# Patient Record
Sex: FEMALE | Race: WHITE | NOT HISPANIC OR LATINO | Employment: FULL TIME | URBAN - METROPOLITAN AREA
[De-identification: names, ages, dates, MRNs, and addresses within clinical notes are randomized per-mention and may not be internally consistent; named-entity substitution may affect disease eponyms.]

---

## 2024-11-18 ENCOUNTER — HOSPITAL ENCOUNTER (INPATIENT)
Facility: HOSPITAL | Age: 54
LOS: 3 days | Discharge: HOME | DRG: 871 | End: 2024-11-21
Attending: STUDENT IN AN ORGANIZED HEALTH CARE EDUCATION/TRAINING PROGRAM | Admitting: STUDENT IN AN ORGANIZED HEALTH CARE EDUCATION/TRAINING PROGRAM
Payer: COMMERCIAL

## 2024-11-18 DIAGNOSIS — R94.31 PROLONGED Q-T INTERVAL ON ECG: ICD-10-CM

## 2024-11-18 DIAGNOSIS — N12 PYELONEPHRITIS: ICD-10-CM

## 2024-11-18 DIAGNOSIS — R65.21 SEPTIC SHOCK: ICD-10-CM

## 2024-11-18 DIAGNOSIS — A41.9 SEPTIC SHOCK: ICD-10-CM

## 2024-11-18 DIAGNOSIS — R55 SYNCOPE, UNSPECIFIED SYNCOPE TYPE: ICD-10-CM

## 2024-11-18 DIAGNOSIS — R07.9 CHEST PAIN: ICD-10-CM

## 2024-11-18 DIAGNOSIS — R78.81 GRAM-NEGATIVE BACTEREMIA: ICD-10-CM

## 2024-11-18 DIAGNOSIS — E03.8 SUBCLINICAL HYPOTHYROIDISM: ICD-10-CM

## 2024-11-18 DIAGNOSIS — E11.10 DIABETIC KETOACIDOSIS WITHOUT COMA ASSOCIATED WITH TYPE 2 DIABETES MELLITUS: Primary | ICD-10-CM

## 2024-11-18 DIAGNOSIS — R73.9 HYPERGLYCEMIA: ICD-10-CM

## 2024-11-18 PROBLEM — N87.0 MILD CERVICAL DYSPLASIA: Status: ACTIVE | Noted: 2018-06-15

## 2024-11-18 PROBLEM — F11.20 OPIOID TYPE DEPENDENCE, CONTINUOUS: Status: ACTIVE | Noted: 2021-06-09

## 2024-11-18 PROBLEM — I10 HYPERTENSION: Status: ACTIVE | Noted: 2018-12-11

## 2024-11-18 LAB
ALBUMIN SERPL BCP-MCNC: 3.2 G/DL (ref 3.5–5.2)
ALLENS TEST: ABNORMAL
ALP SERPL-CCNC: 74 U/L (ref 40–150)
ALT SERPL W/O P-5'-P-CCNC: 15 U/L (ref 10–44)
ANION GAP SERPL CALC-SCNC: 14 MMOL/L (ref 8–16)
ANION GAP SERPL CALC-SCNC: 17 MMOL/L (ref 8–16)
ANION GAP SERPL CALC-SCNC: 21 MMOL/L (ref 8–16)
AST SERPL-CCNC: 10 U/L (ref 10–40)
B-OH-BUTYR BLD STRIP-SCNC: 4.5 MMOL/L (ref 0–0.5)
BACTERIA #/AREA URNS HPF: ABNORMAL /HPF
BASOPHILS # BLD AUTO: 0.02 K/UL (ref 0–0.2)
BASOPHILS NFR BLD: 0.2 % (ref 0–1.9)
BILIRUB SERPL-MCNC: 0.8 MG/DL (ref 0.1–1)
BILIRUB UR QL STRIP: NEGATIVE
BNP SERPL-MCNC: 224 PG/ML (ref 0–99)
BUN SERPL-MCNC: 13 MG/DL (ref 6–20)
BUN SERPL-MCNC: 16 MG/DL (ref 6–20)
BUN SERPL-MCNC: 9 MG/DL (ref 6–20)
CALCIUM SERPL-MCNC: 8.9 MG/DL (ref 8.7–10.5)
CALCIUM SERPL-MCNC: 9.1 MG/DL (ref 8.7–10.5)
CALCIUM SERPL-MCNC: 9.4 MG/DL (ref 8.7–10.5)
CHLORIDE SERPL-SCNC: 90 MMOL/L (ref 95–110)
CHLORIDE SERPL-SCNC: 97 MMOL/L (ref 95–110)
CHLORIDE SERPL-SCNC: 98 MMOL/L (ref 95–110)
CHOLEST SERPL-MCNC: 142 MG/DL (ref 120–199)
CHOLEST/HDLC SERPL: 4.3 {RATIO} (ref 2–5)
CLARITY UR: ABNORMAL
CO2 SERPL-SCNC: 16 MMOL/L (ref 23–29)
CO2 SERPL-SCNC: 16 MMOL/L (ref 23–29)
CO2 SERPL-SCNC: 23 MMOL/L (ref 23–29)
COLOR UR: YELLOW
CREAT SERPL-MCNC: 0.8 MG/DL (ref 0.5–1.4)
CREAT SERPL-MCNC: 0.8 MG/DL (ref 0.5–1.4)
CREAT SERPL-MCNC: 1.3 MG/DL (ref 0.5–1.4)
CTP QC/QA: YES
CTP QC/QA: YES
DIFFERENTIAL METHOD BLD: ABNORMAL
EOSINOPHIL # BLD AUTO: 0 K/UL (ref 0–0.5)
EOSINOPHIL NFR BLD: 0 % (ref 0–8)
ERYTHROCYTE [DISTWIDTH] IN BLOOD BY AUTOMATED COUNT: 11.7 % (ref 11.5–14.5)
EST. GFR  (NO RACE VARIABLE): 49 ML/MIN/1.73 M^2
EST. GFR  (NO RACE VARIABLE): >60 ML/MIN/1.73 M^2
EST. GFR  (NO RACE VARIABLE): >60 ML/MIN/1.73 M^2
ESTIMATED AVG GLUCOSE: 235 MG/DL (ref 68–131)
GLUCOSE SERPL-MCNC: 111 MG/DL (ref 70–110)
GLUCOSE SERPL-MCNC: 217 MG/DL (ref 70–110)
GLUCOSE SERPL-MCNC: 453 MG/DL (ref 70–110)
GLUCOSE UR QL STRIP: ABNORMAL
HBA1C MFR BLD: 9.8 % (ref 4–5.6)
HCG INTACT+B SERPL-ACNC: 2.1 MIU/ML
HCO3 UR-SCNC: 20.9 MMOL/L (ref 24–28)
HCT VFR BLD AUTO: 41.9 % (ref 37–48.5)
HDLC SERPL-MCNC: 33 MG/DL (ref 40–75)
HDLC SERPL: 23.2 % (ref 20–50)
HGB BLD-MCNC: 14.7 G/DL (ref 12–16)
HGB UR QL STRIP: ABNORMAL
HYALINE CASTS #/AREA URNS LPF: 0 /LPF
IMM GRANULOCYTES # BLD AUTO: 0.06 K/UL (ref 0–0.04)
IMM GRANULOCYTES NFR BLD AUTO: 0.5 % (ref 0–0.5)
KETONES UR QL STRIP: ABNORMAL
LACTATE SERPL-SCNC: 0.9 MMOL/L (ref 0.5–2.2)
LDLC SERPL CALC-MCNC: 84 MG/DL (ref 63–159)
LEUKOCYTE ESTERASE UR QL STRIP: ABNORMAL
LYMPHOCYTES # BLD AUTO: 1.1 K/UL (ref 1–4.8)
LYMPHOCYTES NFR BLD: 9.8 % (ref 18–48)
MAGNESIUM SERPL-MCNC: 1.3 MG/DL (ref 1.6–2.6)
MAGNESIUM SERPL-MCNC: 1.4 MG/DL (ref 1.6–2.6)
MCH RBC QN AUTO: 29.5 PG (ref 27–31)
MCHC RBC AUTO-ENTMCNC: 35.1 G/DL (ref 32–36)
MCV RBC AUTO: 84 FL (ref 82–98)
MICROSCOPIC COMMENT: ABNORMAL
MONOCYTES # BLD AUTO: 0.8 K/UL (ref 0.3–1)
MONOCYTES NFR BLD: 7.1 % (ref 4–15)
NEUTROPHILS # BLD AUTO: 9.6 K/UL (ref 1.8–7.7)
NEUTROPHILS NFR BLD: 82.4 % (ref 38–73)
NITRITE UR QL STRIP: NEGATIVE
NONHDLC SERPL-MCNC: 109 MG/DL
NRBC BLD-RTO: 0 /100 WBC
OHS QRS DURATION: 88 MS
OHS QTC CALCULATION: 534 MS
PCO2 BLDA: 43.5 MMHG (ref 35–45)
PH SMN: 7.29 [PH] (ref 7.35–7.45)
PH UR STRIP: 6 [PH] (ref 5–8)
PHOSPHATE SERPL-MCNC: 3.7 MG/DL (ref 2.7–4.5)
PLATELET # BLD AUTO: 337 K/UL (ref 150–450)
PMV BLD AUTO: 10.3 FL (ref 9.2–12.9)
PO2 BLDA: 32 MMHG (ref 40–60)
POC BE: -6 MMOL/L
POC MOLECULAR INFLUENZA A AGN: NEGATIVE
POC MOLECULAR INFLUENZA B AGN: NEGATIVE
POC SATURATED O2: 55 % (ref 95–100)
POC TCO2: 22 MMOL/L (ref 24–29)
POCT GLUCOSE: 109 MG/DL (ref 70–110)
POCT GLUCOSE: 125 MG/DL (ref 70–110)
POCT GLUCOSE: 152 MG/DL (ref 70–110)
POCT GLUCOSE: 154 MG/DL (ref 70–110)
POCT GLUCOSE: 170 MG/DL (ref 70–110)
POCT GLUCOSE: 210 MG/DL (ref 70–110)
POCT GLUCOSE: 211 MG/DL (ref 70–110)
POCT GLUCOSE: 238 MG/DL (ref 70–110)
POCT GLUCOSE: 256 MG/DL (ref 70–110)
POCT GLUCOSE: 374 MG/DL (ref 70–110)
POCT GLUCOSE: 422 MG/DL (ref 70–110)
POCT GLUCOSE: 472 MG/DL (ref 70–110)
POCT GLUCOSE: >500 MG/DL (ref 70–110)
POTASSIUM SERPL-SCNC: 3.9 MMOL/L (ref 3.5–5.1)
PROT SERPL-MCNC: 7.3 G/DL (ref 6–8.4)
PROT UR QL STRIP: ABNORMAL
RBC # BLD AUTO: 4.98 M/UL (ref 4–5.4)
RBC #/AREA URNS HPF: 23 /HPF (ref 0–4)
SAMPLE: ABNORMAL
SARS-COV-2 RDRP RESP QL NAA+PROBE: NEGATIVE
SITE: ABNORMAL
SODIUM SERPL-SCNC: 127 MMOL/L (ref 136–145)
SODIUM SERPL-SCNC: 130 MMOL/L (ref 136–145)
SODIUM SERPL-SCNC: 135 MMOL/L (ref 136–145)
SP GR UR STRIP: 1.02 (ref 1–1.03)
SQUAMOUS #/AREA URNS HPF: 4 /HPF
T4 FREE SERPL-MCNC: 1.2 NG/DL (ref 0.71–1.51)
TRIGL SERPL-MCNC: 125 MG/DL (ref 30–150)
TROPONIN I SERPL DL<=0.01 NG/ML-MCNC: 0.01 NG/ML (ref 0–0.03)
TSH SERPL DL<=0.005 MIU/L-ACNC: 6.65 UIU/ML (ref 0.4–4)
URN SPEC COLLECT METH UR: ABNORMAL
UROBILINOGEN UR STRIP-ACNC: NEGATIVE EU/DL
WBC # BLD AUTO: 11.62 K/UL (ref 3.9–12.7)
WBC #/AREA URNS HPF: >100 /HPF (ref 0–5)
WBC CLUMPS URNS QL MICRO: ABNORMAL
YEAST URNS QL MICRO: ABNORMAL

## 2024-11-18 PROCEDURE — 82803 BLOOD GASES ANY COMBINATION: CPT

## 2024-11-18 PROCEDURE — 83880 ASSAY OF NATRIURETIC PEPTIDE: CPT | Performed by: PHYSICIAN ASSISTANT

## 2024-11-18 PROCEDURE — 63600175 PHARM REV CODE 636 W HCPCS: Performed by: HOSPITALIST

## 2024-11-18 PROCEDURE — 85025 COMPLETE CBC W/AUTO DIFF WBC: CPT | Performed by: PHYSICIAN ASSISTANT

## 2024-11-18 PROCEDURE — 21400001 HC TELEMETRY ROOM

## 2024-11-18 PROCEDURE — 84484 ASSAY OF TROPONIN QUANT: CPT | Performed by: PHYSICIAN ASSISTANT

## 2024-11-18 PROCEDURE — 84702 CHORIONIC GONADOTROPIN TEST: CPT | Performed by: HOSPITALIST

## 2024-11-18 PROCEDURE — 80048 BASIC METABOLIC PNL TOTAL CA: CPT | Mod: 91,XB | Performed by: STUDENT IN AN ORGANIZED HEALTH CARE EDUCATION/TRAINING PROGRAM

## 2024-11-18 PROCEDURE — 80061 LIPID PANEL: CPT | Performed by: STUDENT IN AN ORGANIZED HEALTH CARE EDUCATION/TRAINING PROGRAM

## 2024-11-18 PROCEDURE — 93005 ELECTROCARDIOGRAM TRACING: CPT

## 2024-11-18 PROCEDURE — 87088 URINE BACTERIA CULTURE: CPT | Performed by: PHYSICIAN ASSISTANT

## 2024-11-18 PROCEDURE — 96365 THER/PROPH/DIAG IV INF INIT: CPT

## 2024-11-18 PROCEDURE — 84439 ASSAY OF FREE THYROXINE: CPT | Performed by: PHYSICIAN ASSISTANT

## 2024-11-18 PROCEDURE — 83605 ASSAY OF LACTIC ACID: CPT | Performed by: HOSPITALIST

## 2024-11-18 PROCEDURE — 81000 URINALYSIS NONAUTO W/SCOPE: CPT | Performed by: PHYSICIAN ASSISTANT

## 2024-11-18 PROCEDURE — 83036 HEMOGLOBIN GLYCOSYLATED A1C: CPT | Performed by: STUDENT IN AN ORGANIZED HEALTH CARE EDUCATION/TRAINING PROGRAM

## 2024-11-18 PROCEDURE — 63600175 PHARM REV CODE 636 W HCPCS: Performed by: STUDENT IN AN ORGANIZED HEALTH CARE EDUCATION/TRAINING PROGRAM

## 2024-11-18 PROCEDURE — 87040 BLOOD CULTURE FOR BACTERIA: CPT | Performed by: PHYSICIAN ASSISTANT

## 2024-11-18 PROCEDURE — 99900035 HC TECH TIME PER 15 MIN (STAT)

## 2024-11-18 PROCEDURE — 36415 COLL VENOUS BLD VENIPUNCTURE: CPT | Performed by: HOSPITALIST

## 2024-11-18 PROCEDURE — 87186 SC STD MICRODIL/AGAR DIL: CPT | Performed by: PHYSICIAN ASSISTANT

## 2024-11-18 PROCEDURE — 84443 ASSAY THYROID STIM HORMONE: CPT | Performed by: PHYSICIAN ASSISTANT

## 2024-11-18 PROCEDURE — 84100 ASSAY OF PHOSPHORUS: CPT | Performed by: STUDENT IN AN ORGANIZED HEALTH CARE EDUCATION/TRAINING PROGRAM

## 2024-11-18 PROCEDURE — 25000003 PHARM REV CODE 250: Performed by: STUDENT IN AN ORGANIZED HEALTH CARE EDUCATION/TRAINING PROGRAM

## 2024-11-18 PROCEDURE — 87635 SARS-COV-2 COVID-19 AMP PRB: CPT | Performed by: PHYSICIAN ASSISTANT

## 2024-11-18 PROCEDURE — 82962 GLUCOSE BLOOD TEST: CPT

## 2024-11-18 PROCEDURE — 87502 INFLUENZA DNA AMP PROBE: CPT

## 2024-11-18 PROCEDURE — 83735 ASSAY OF MAGNESIUM: CPT | Mod: 91 | Performed by: STUDENT IN AN ORGANIZED HEALTH CARE EDUCATION/TRAINING PROGRAM

## 2024-11-18 PROCEDURE — 80048 BASIC METABOLIC PNL TOTAL CA: CPT | Mod: XB | Performed by: HOSPITALIST

## 2024-11-18 PROCEDURE — 63600175 PHARM REV CODE 636 W HCPCS: Performed by: PHYSICIAN ASSISTANT

## 2024-11-18 PROCEDURE — 96361 HYDRATE IV INFUSION ADD-ON: CPT

## 2024-11-18 PROCEDURE — 83735 ASSAY OF MAGNESIUM: CPT | Performed by: PHYSICIAN ASSISTANT

## 2024-11-18 PROCEDURE — 25000003 PHARM REV CODE 250: Performed by: PHYSICIAN ASSISTANT

## 2024-11-18 PROCEDURE — 82010 KETONE BODYS QUAN: CPT | Performed by: PHYSICIAN ASSISTANT

## 2024-11-18 PROCEDURE — 80053 COMPREHEN METABOLIC PANEL: CPT | Performed by: PHYSICIAN ASSISTANT

## 2024-11-18 PROCEDURE — 25000003 PHARM REV CODE 250: Performed by: HOSPITALIST

## 2024-11-18 PROCEDURE — 96360 HYDRATION IV INFUSION INIT: CPT | Mod: 59

## 2024-11-18 PROCEDURE — 87086 URINE CULTURE/COLONY COUNT: CPT | Performed by: PHYSICIAN ASSISTANT

## 2024-11-18 PROCEDURE — 99285 EMERGENCY DEPT VISIT HI MDM: CPT | Mod: 25

## 2024-11-18 PROCEDURE — 93010 ELECTROCARDIOGRAM REPORT: CPT | Mod: ,,, | Performed by: INTERNAL MEDICINE

## 2024-11-18 RX ORDER — TIZANIDINE 2 MG/1
2 TABLET ORAL DAILY PRN
Status: DISCONTINUED | OUTPATIENT
Start: 2024-11-18 | End: 2024-11-21 | Stop reason: HOSPADM

## 2024-11-18 RX ORDER — SODIUM CHLORIDE 0.9 % (FLUSH) 0.9 %
10 SYRINGE (ML) INJECTION
Status: DISCONTINUED | OUTPATIENT
Start: 2024-11-18 | End: 2024-11-21 | Stop reason: HOSPADM

## 2024-11-18 RX ORDER — LISINOPRIL 20 MG/1
20 TABLET ORAL
COMMUNITY

## 2024-11-18 RX ORDER — HYDROCODONE BITARTRATE AND ACETAMINOPHEN 10; 325 MG/1; MG/1
1 TABLET ORAL 3 TIMES DAILY PRN
COMMUNITY
Start: 2024-11-06 | End: 2025-02-03

## 2024-11-18 RX ORDER — NOREPINEPHRINE BITARTRATE/D5W 4MG/250ML
0-3 PLASTIC BAG, INJECTION (ML) INTRAVENOUS CONTINUOUS
Status: DISCONTINUED | OUTPATIENT
Start: 2024-11-18 | End: 2024-11-18

## 2024-11-18 RX ORDER — ERGOCALCIFEROL 1.25 MG/1
50000 CAPSULE ORAL
COMMUNITY
Start: 2024-04-08

## 2024-11-18 RX ORDER — SODIUM CHLORIDE 0.9 % (FLUSH) 0.9 %
10 SYRINGE (ML) INJECTION
Status: DISCONTINUED | OUTPATIENT
Start: 2024-11-18 | End: 2024-11-18

## 2024-11-18 RX ORDER — PREGABALIN 100 MG/1
1 CAPSULE ORAL 3 TIMES DAILY
COMMUNITY
Start: 2024-10-24 | End: 2025-10-24

## 2024-11-18 RX ORDER — INSULIN GLARGINE 100 [IU]/ML
10 INJECTION, SOLUTION SUBCUTANEOUS NIGHTLY
Status: DISCONTINUED | OUTPATIENT
Start: 2024-11-18 | End: 2024-11-21 | Stop reason: HOSPADM

## 2024-11-18 RX ORDER — SIMETHICONE 80 MG
1 TABLET,CHEWABLE ORAL 4 TIMES DAILY PRN
Status: DISCONTINUED | OUTPATIENT
Start: 2024-11-18 | End: 2024-11-21 | Stop reason: HOSPADM

## 2024-11-18 RX ORDER — SODIUM CHLORIDE 9 MG/ML
1000 INJECTION, SOLUTION INTRAVENOUS CONTINUOUS
Status: ACTIVE | OUTPATIENT
Start: 2024-11-18 | End: 2024-11-18

## 2024-11-18 RX ORDER — MAGNESIUM SULFATE HEPTAHYDRATE 40 MG/ML
2 INJECTION, SOLUTION INTRAVENOUS
Status: DISCONTINUED | OUTPATIENT
Start: 2024-11-18 | End: 2024-11-21 | Stop reason: HOSPADM

## 2024-11-18 RX ORDER — TALC
6 POWDER (GRAM) TOPICAL NIGHTLY PRN
Status: DISCONTINUED | OUTPATIENT
Start: 2024-11-18 | End: 2024-11-19

## 2024-11-18 RX ORDER — CALCIUM GLUCONATE 20 MG/ML
2 INJECTION, SOLUTION INTRAVENOUS
Status: DISCONTINUED | OUTPATIENT
Start: 2024-11-18 | End: 2024-11-21 | Stop reason: HOSPADM

## 2024-11-18 RX ORDER — ACETAMINOPHEN 325 MG/1
650 TABLET ORAL EVERY 4 HOURS PRN
Status: DISCONTINUED | OUTPATIENT
Start: 2024-11-18 | End: 2024-11-21 | Stop reason: HOSPADM

## 2024-11-18 RX ORDER — ESCITALOPRAM OXALATE 10 MG/1
10 TABLET ORAL DAILY
Status: DISCONTINUED | OUTPATIENT
Start: 2024-11-18 | End: 2024-11-21 | Stop reason: HOSPADM

## 2024-11-18 RX ORDER — FOLIC ACID 1 MG/1
1 TABLET ORAL DAILY
COMMUNITY
Start: 2024-10-01 | End: 2025-10-01

## 2024-11-18 RX ORDER — MUPIROCIN 20 MG/G
OINTMENT TOPICAL 2 TIMES DAILY
Status: DISCONTINUED | OUTPATIENT
Start: 2024-11-18 | End: 2024-11-21 | Stop reason: HOSPADM

## 2024-11-18 RX ORDER — IBUPROFEN 200 MG
24 TABLET ORAL
Status: DISCONTINUED | OUTPATIENT
Start: 2024-11-18 | End: 2024-11-18

## 2024-11-18 RX ORDER — MAGNESIUM SULFATE HEPTAHYDRATE 40 MG/ML
4 INJECTION, SOLUTION INTRAVENOUS
Status: DISCONTINUED | OUTPATIENT
Start: 2024-11-18 | End: 2024-11-21 | Stop reason: HOSPADM

## 2024-11-18 RX ORDER — HYDROCODONE BITARTRATE AND ACETAMINOPHEN 10; 325 MG/1; MG/1
1 TABLET ORAL EVERY 4 HOURS PRN
Status: CANCELLED | OUTPATIENT
Start: 2024-11-18

## 2024-11-18 RX ORDER — SODIUM CHLORIDE 9 MG/ML
1000 INJECTION, SOLUTION INTRAVENOUS CONTINUOUS
Status: DISCONTINUED | OUTPATIENT
Start: 2024-11-18 | End: 2024-11-18

## 2024-11-18 RX ORDER — LANOLIN ALCOHOL/MO/W.PET/CERES
800 CREAM (GRAM) TOPICAL
Status: DISCONTINUED | OUTPATIENT
Start: 2024-11-18 | End: 2024-11-21 | Stop reason: HOSPADM

## 2024-11-18 RX ORDER — METFORMIN HYDROCHLORIDE 500 MG/1
500 TABLET ORAL 2 TIMES DAILY WITH MEALS
COMMUNITY

## 2024-11-18 RX ORDER — DEXTROSE MONOHYDRATE AND SODIUM CHLORIDE 5; .45 G/100ML; G/100ML
INJECTION, SOLUTION INTRAVENOUS CONTINUOUS PRN
Status: ACTIVE | OUTPATIENT
Start: 2024-11-18 | End: 2024-11-18

## 2024-11-18 RX ORDER — DEXTROSE MONOHYDRATE AND SODIUM CHLORIDE 5; .45 G/100ML; G/100ML
INJECTION, SOLUTION INTRAVENOUS CONTINUOUS PRN
Status: DISCONTINUED | OUTPATIENT
Start: 2024-11-18 | End: 2024-11-18

## 2024-11-18 RX ORDER — IBUPROFEN 200 MG
16 TABLET ORAL
Status: DISCONTINUED | OUTPATIENT
Start: 2024-11-18 | End: 2024-11-21 | Stop reason: HOSPADM

## 2024-11-18 RX ORDER — INSULIN GLARGINE 100 [IU]/ML
6 INJECTION, SOLUTION SUBCUTANEOUS ONCE
Status: COMPLETED | OUTPATIENT
Start: 2024-11-18 | End: 2024-11-18

## 2024-11-18 RX ORDER — METOCLOPRAMIDE HYDROCHLORIDE 5 MG/ML
10 INJECTION INTRAMUSCULAR; INTRAVENOUS
Status: DISCONTINUED | OUTPATIENT
Start: 2024-11-18 | End: 2024-11-18

## 2024-11-18 RX ORDER — POLYETHYLENE GLYCOL 3350 17 G/17G
17 POWDER, FOR SOLUTION ORAL DAILY
Status: DISCONTINUED | OUTPATIENT
Start: 2024-11-18 | End: 2024-11-21 | Stop reason: HOSPADM

## 2024-11-18 RX ORDER — INSULIN ASPART 100 [IU]/ML
0-10 INJECTION, SOLUTION INTRAVENOUS; SUBCUTANEOUS
Status: DISCONTINUED | OUTPATIENT
Start: 2024-11-18 | End: 2024-11-21 | Stop reason: HOSPADM

## 2024-11-18 RX ORDER — SODIUM,POTASSIUM PHOSPHATES 280-250MG
2 POWDER IN PACKET (EA) ORAL
Status: DISCONTINUED | OUTPATIENT
Start: 2024-11-18 | End: 2024-11-21 | Stop reason: HOSPADM

## 2024-11-18 RX ORDER — ONDANSETRON HYDROCHLORIDE 2 MG/ML
4 INJECTION, SOLUTION INTRAVENOUS EVERY 8 HOURS PRN
Status: DISCONTINUED | OUTPATIENT
Start: 2024-11-18 | End: 2024-11-18

## 2024-11-18 RX ORDER — ESCITALOPRAM OXALATE 10 MG/1
10 TABLET ORAL
COMMUNITY
Start: 2024-10-01 | End: 2025-10-01

## 2024-11-18 RX ORDER — HYDROCODONE BITARTRATE AND ACETAMINOPHEN 10; 325 MG/1; MG/1
1 TABLET ORAL EVERY 4 HOURS PRN
Status: DISCONTINUED | OUTPATIENT
Start: 2024-11-18 | End: 2024-11-21 | Stop reason: HOSPADM

## 2024-11-18 RX ORDER — ONDANSETRON HYDROCHLORIDE 2 MG/ML
4 INJECTION, SOLUTION INTRAVENOUS EVERY 6 HOURS PRN
Status: DISCONTINUED | OUTPATIENT
Start: 2024-11-18 | End: 2024-11-21 | Stop reason: HOSPADM

## 2024-11-18 RX ORDER — PREGABALIN 50 MG/1
100 CAPSULE ORAL 3 TIMES DAILY
Status: DISCONTINUED | OUTPATIENT
Start: 2024-11-18 | End: 2024-11-21 | Stop reason: HOSPADM

## 2024-11-18 RX ORDER — HYDROCODONE BITARTRATE AND ACETAMINOPHEN 10; 325 MG/1; MG/1
1 TABLET ORAL EVERY 6 HOURS PRN
Status: DISCONTINUED | OUTPATIENT
Start: 2024-11-18 | End: 2024-11-18

## 2024-11-18 RX ORDER — HYDROCODONE BITARTRATE AND ACETAMINOPHEN 5; 325 MG/1; MG/1
1 TABLET ORAL EVERY 6 HOURS PRN
Status: DISCONTINUED | OUTPATIENT
Start: 2024-11-18 | End: 2024-11-18

## 2024-11-18 RX ORDER — INSULIN ASPART 100 [IU]/ML
5 INJECTION, SOLUTION INTRAVENOUS; SUBCUTANEOUS
Status: DISCONTINUED | OUTPATIENT
Start: 2024-11-18 | End: 2024-11-21 | Stop reason: HOSPADM

## 2024-11-18 RX ORDER — GLUCAGON 1 MG
1 KIT INJECTION
Status: DISCONTINUED | OUTPATIENT
Start: 2024-11-18 | End: 2024-11-18

## 2024-11-18 RX ORDER — LISINOPRIL 20 MG/1
20 TABLET ORAL DAILY
Status: DISCONTINUED | OUTPATIENT
Start: 2024-11-18 | End: 2024-11-18

## 2024-11-18 RX ORDER — IBUPROFEN 200 MG
16 TABLET ORAL
Status: DISCONTINUED | OUTPATIENT
Start: 2024-11-18 | End: 2024-11-18

## 2024-11-18 RX ORDER — NALOXONE HCL 0.4 MG/ML
0.02 VIAL (ML) INJECTION
Status: DISCONTINUED | OUTPATIENT
Start: 2024-11-18 | End: 2024-11-21 | Stop reason: HOSPADM

## 2024-11-18 RX ORDER — CALCIUM GLUCONATE 20 MG/ML
3 INJECTION, SOLUTION INTRAVENOUS
Status: DISCONTINUED | OUTPATIENT
Start: 2024-11-18 | End: 2024-11-21 | Stop reason: HOSPADM

## 2024-11-18 RX ORDER — GLUCAGON 1 MG
1 KIT INJECTION
Status: DISCONTINUED | OUTPATIENT
Start: 2024-11-18 | End: 2024-11-21 | Stop reason: HOSPADM

## 2024-11-18 RX ORDER — LISINOPRIL 5 MG/1
10 TABLET ORAL DAILY
Status: DISCONTINUED | OUTPATIENT
Start: 2024-11-18 | End: 2024-11-21 | Stop reason: HOSPADM

## 2024-11-18 RX ORDER — ACETAMINOPHEN 325 MG/1
650 TABLET ORAL EVERY 4 HOURS PRN
Status: CANCELLED | OUTPATIENT
Start: 2024-11-18

## 2024-11-18 RX ORDER — BISACODYL 10 MG/1
10 SUPPOSITORY RECTAL DAILY PRN
Status: DISCONTINUED | OUTPATIENT
Start: 2024-11-18 | End: 2024-11-21 | Stop reason: HOSPADM

## 2024-11-18 RX ORDER — IBUPROFEN 200 MG
24 TABLET ORAL
Status: DISCONTINUED | OUTPATIENT
Start: 2024-11-18 | End: 2024-11-21 | Stop reason: HOSPADM

## 2024-11-18 RX ORDER — CEFTRIAXONE 2 G/1
2 INJECTION, POWDER, FOR SOLUTION INTRAMUSCULAR; INTRAVENOUS
Status: COMPLETED | OUTPATIENT
Start: 2024-11-18 | End: 2024-11-18

## 2024-11-18 RX ORDER — CALCIUM GLUCONATE 20 MG/ML
1 INJECTION, SOLUTION INTRAVENOUS
Status: DISCONTINUED | OUTPATIENT
Start: 2024-11-18 | End: 2024-11-21 | Stop reason: HOSPADM

## 2024-11-18 RX ORDER — METOCLOPRAMIDE HYDROCHLORIDE 5 MG/ML
5 INJECTION INTRAMUSCULAR; INTRAVENOUS
Status: DISCONTINUED | OUTPATIENT
Start: 2024-11-19 | End: 2024-11-21 | Stop reason: HOSPADM

## 2024-11-18 RX ORDER — ACETAMINOPHEN 325 MG/1
650 TABLET ORAL EVERY 8 HOURS PRN
Status: DISCONTINUED | OUTPATIENT
Start: 2024-11-18 | End: 2024-11-21 | Stop reason: HOSPADM

## 2024-11-18 RX ORDER — TIZANIDINE 4 MG/1
TABLET ORAL
COMMUNITY
Start: 2024-10-24

## 2024-11-18 RX ORDER — AMOXICILLIN 250 MG
1 CAPSULE ORAL 2 TIMES DAILY PRN
Status: DISCONTINUED | OUTPATIENT
Start: 2024-11-18 | End: 2024-11-21 | Stop reason: HOSPADM

## 2024-11-18 RX ADMIN — PROMETHAZINE HYDROCHLORIDE 12.5 MG: 25 INJECTION INTRAMUSCULAR; INTRAVENOUS at 08:11

## 2024-11-18 RX ADMIN — SODIUM CHLORIDE, POTASSIUM CHLORIDE, SODIUM LACTATE AND CALCIUM CHLORIDE 2000 ML: 600; 310; 30; 20 INJECTION, SOLUTION INTRAVENOUS at 01:11

## 2024-11-18 RX ADMIN — LISINOPRIL 10 MG: 5 TABLET ORAL at 11:11

## 2024-11-18 RX ADMIN — CEFTRIAXONE 2 G: 2 INJECTION, POWDER, FOR SOLUTION INTRAMUSCULAR; INTRAVENOUS at 03:11

## 2024-11-18 RX ADMIN — INSULIN HUMAN 0.1 UNITS/KG/HR: 1 INJECTION, SOLUTION INTRAVENOUS at 02:11

## 2024-11-18 RX ADMIN — TIZANIDINE 2 MG: 2 TABLET ORAL at 03:11

## 2024-11-18 RX ADMIN — MUPIROCIN: 20 OINTMENT TOPICAL at 11:11

## 2024-11-18 RX ADMIN — SODIUM CHLORIDE 1000 ML: 9 INJECTION, SOLUTION INTRAVENOUS at 04:11

## 2024-11-18 RX ADMIN — POTASSIUM BICARBONATE 50 MEQ: 977.5 TABLET, EFFERVESCENT ORAL at 04:11

## 2024-11-18 RX ADMIN — METOCLOPRAMIDE 10 MG: 5 INJECTION, SOLUTION INTRAMUSCULAR; INTRAVENOUS at 11:11

## 2024-11-18 RX ADMIN — INSULIN ASPART 5 UNITS: 100 INJECTION, SOLUTION INTRAVENOUS; SUBCUTANEOUS at 03:11

## 2024-11-18 RX ADMIN — NOREPINEPHRINE BITARTRATE 0.05 MCG/KG/MIN: 4 INJECTION, SOLUTION INTRAVENOUS at 05:11

## 2024-11-18 RX ADMIN — ESCITALOPRAM OXALATE 10 MG: 10 TABLET ORAL at 09:11

## 2024-11-18 RX ADMIN — PREGABALIN 100 MG: 50 CAPSULE ORAL at 02:11

## 2024-11-18 RX ADMIN — PREGABALIN 100 MG: 50 CAPSULE ORAL at 09:11

## 2024-11-18 RX ADMIN — HYDROCODONE BITARTRATE AND ACETAMINOPHEN 1 TABLET: 10; 325 TABLET ORAL at 03:11

## 2024-11-18 RX ADMIN — HYDROCODONE BITARTRATE AND ACETAMINOPHEN 1 TABLET: 10; 325 TABLET ORAL at 11:11

## 2024-11-18 RX ADMIN — ONDANSETRON 4 MG: 2 INJECTION INTRAMUSCULAR; INTRAVENOUS at 06:11

## 2024-11-18 RX ADMIN — INSULIN GLARGINE 6 UNITS: 100 INJECTION, SOLUTION SUBCUTANEOUS at 11:11

## 2024-11-18 RX ADMIN — Medication 6 MG: at 08:11

## 2024-11-18 RX ADMIN — INSULIN GLARGINE 10 UNITS: 100 INJECTION, SOLUTION SUBCUTANEOUS at 08:11

## 2024-11-18 RX ADMIN — INSULIN ASPART 2 UNITS: 100 INJECTION, SOLUTION INTRAVENOUS; SUBCUTANEOUS at 03:11

## 2024-11-18 RX ADMIN — PREGABALIN 100 MG: 50 CAPSULE ORAL at 08:11

## 2024-11-18 RX ADMIN — INSULIN ASPART 2 UNITS: 100 INJECTION, SOLUTION INTRAVENOUS; SUBCUTANEOUS at 08:11

## 2024-11-18 RX ADMIN — MUPIROCIN 1 G: 20 OINTMENT TOPICAL at 08:11

## 2024-11-18 NOTE — NURSING
Patient arrived to the unit at 0602 on insulin drip at 0.1 unit per hour and 0.05 mcg/kg/min levophed. NS infusing at 125. Patient belongings at bedside.Oriented to unit, call bell within reach. . Up to bedside commode with nurse assist, voided 550 ml.

## 2024-11-18 NOTE — NURSING TRANSFER
Nursing Transfer Note      11/18/2024   2:16 PM    Nurse giving handoff:MIKAYLA Grijalva    Nurse receiving handoff:MIKAYLA Bai    Reason patient is being transferred: transfer orders    Transfer To: [id number]    Transfer via bed    Transfer with belongings    Transported by transport    Transfer Vital Signs:  Blood Pressure:156/88  Heart Rate:96  O2:99  Temperature:98.9  Respirations:23    Telemetry: n/a  Order for Tele Monitor? No    Additional Lines: n/a    Medicines sent: n/a    Any special needs or follow-up needed: n/a    Patient belongings transferred with patient: Yes    Chart send with patient: Yes    Notified: pt notified family    Patient reassessed at: 1400 11/18/24 (date, time)  1

## 2024-11-18 NOTE — EICU
EICU BRIEF ADMIT NOTE:    HISTORY:  DKA Please refer to H/P and ER notes for detail    CAMERA ASSESSMENT: Two way audiovisual assessment was done: Yes    Telemetry was reviewed. Medical records including notes, labs and imaging were reviewed.Yes    DISCUSSED with bedside nurse.no    ASSESSMENT AND PLAN:    # DKA: IV fluids, Insulin infusion, BMP and accucheck per DKA protocol      BEST PRACTICES REVIEW:    INTUBATED: No  GLYCEMIN CONTROL:  Diabetes: Yes  Insulin Infusion.  STRESS ULCER PROPHYLAXISNot indicated   DVT PROPHYLAXIS:  Mechanical    Thank You for allowing EICU to participate in the care of the patient. Please call as needed      Edmar Morales MD  EICU  Critical Care Medicine

## 2024-11-18 NOTE — PLAN OF CARE
Case Management Assessment     PCP: None Local (Will accept [facility])  Pharmacy: [facility], [place]     Patient Arrived From: temporary home  Existing Help at Home: daughterElis    Barriers to Discharge: none    Discharge Plan:    A. home   B. 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  He is independent and drives.  He daughter will help her to get home at time of DC.          11/18/24 5072   Discharge Assessment   Assessment Type Discharge Planning Assessment   Confirmed/corrected address, phone number and insurance Yes   Confirmed Demographics Correct on Facesheet   Source of Information patient   If unable to respond/provide information was family/caregiver contacted? Yes   Communicated REGGIE with patient/caregiver Yes   People in Home child(mary), adult   Do you expect to return to your current living situation? Yes   Do you have help at home or someone to help you manage your care at home? Yes   Prior to hospitilization cognitive status: Alert/Oriented   Current cognitive status: Alert/Oriented   Walking or Climbing Stairs Difficulty no   Dressing/Bathing Difficulty no   Equipment Currently Used at Home glucometer   Readmission within 30 days? No   Patient currently being followed by outpatient case management? No   Do you currently have service(s) that help you manage your care at home? No   Do you take prescription medications? Yes   Do you have prescription coverage? Yes   Do you have any problems affording any of your prescribed medications? No   Is the patient taking medications as prescribed? yes   How do you get to doctors appointments? car, drives self   Are you on dialysis? No   Do you take coumadin? No   Discharge Plan A Home   Discharge Plan B Home   DME Needed Upon Discharge  none   Discharge Plan discussed with: Patient   Transition of Care Barriers None

## 2024-11-18 NOTE — ED PROVIDER NOTES
"Encounter Date: 11/18/2024       History     Chief Complaint   Patient presents with    Hypotension     Pt arrived in ED, c/o generalized weakness, dizziness since Friday. Pt reports have 3 syncopal episodes last night. Pt is hypotensive at 70/43 in triage. Pt denies any CP, SOB, abd pain or n/v/d    Dizziness     55yo F, former smoker, presents to ED with daughter due to syncopal episode.    Patient states she began to feel unwell on Friday.  Admits to myalgias, fatigue, generally feeling unwell.  States had a very transient episode of dyspnea that day, none since then.  Went to a local urgent care, given a "steroid shot".  Admits to worsening fatigue, generalized weakness since then.  Associated poor appetite and intake. No cough.  No odynophagia.  No rhinorrhea or congestion.  Denies any recent illness.  No abdominal pain.  No nausea vomiting.  Denies diarrhea.  She states begin to feel lightheaded and near syncopal yesterday.  Admits to 2-3 episodes of syncope yesterday---once while she was on the toilet, passed out on the way to the restroom earlier this morning just prior to arrival, prompting ED visit; daughter states that she was woken by patient following to the floor.  Patient denies any known head injury, denies headache or acute neck pain.  Denies unilateral arm or leg weakness, slurred speech, facial droop, unsteady gait, aphasia.  Admits to difficulty with weight-bearing due to lightheadedness, needing to support herself.  No history of CVA.  Daughter states that she was on the ground for approx 1 minute, eyes open, not really responding.  No apnea.  No CPR performed.  No generalized shaking movements.  No tongue biting.  No head turning.  No history of seizure disorder.  She denies any precursors chest pain, shortness of breath, prior to onset of syncopal episodes.  Denies history of syncope. Denies personal history of ACS.  Denies hyperlipidemia.  Does admit to family history of premature cardiac " disease.  No personal history of CHF.  Denies orthopnea or paroxysmal nocturnal dyspnea.  No new leg swelling. She presents to ED complaining of feeling lightheaded, fatigue, generally unwell.  She does admit to possible fever, chills off and on since onset of current complaints. She does admit to increased urinary frequency over the past 7-10d.  No dysuria, hematuria, or strong odor to urine.  Denies frequent UTIs.  Denies flank pain.  He admits to polydipsia beginning yesterday.  She denies ever having to be hospitalized for her diabetes.    Took her BP medication prior to arrival    No exogenous estrogen, no history of VTE, no unilateral leg swelling or calf pain.    No antiplatelet or anticoagulation use    Receives Rituximab infusions for RA q4 months    PMH:  NIDDM  HTN  HLD  RA  Degenerative disc disease  Chronic pain  Depression   GERD  Chronic opiate therapy    Previously documented rx:  Lipitor, albuterol, singulair, lexapro, exenatide, folic acid, levothyroxine, lisinopril, metformin, movantik, vit D      Review of patient's allergies indicates:  Not on File  Past Medical History:   Diagnosis Date    Back pain     Depression     DM (diabetes mellitus)     Gallstone     GERD (gastroesophageal reflux disease)     HSV infection     HTN (hypertension)     Rheumatoid arthritis, unspecified      Past Surgical History:   Procedure Laterality Date    ANKLE SURGERY       SECTION      x2    CHOLECYSTECTOMY      FOOT SURGERY      LOOP ELECTROSURGICAL EXCISION PROCEDURE (LEEP)       No family history on file.  Social History     Tobacco Use    Smoking status: Unknown   Substance Use Topics    Alcohol use: Not Currently     Review of Systems   Constitutional:  Positive for appetite change, chills, fatigue and fever.   HENT:  Negative for congestion, rhinorrhea and sore throat.    Respiratory:  Negative for cough and shortness of breath.    Cardiovascular:  Negative for chest pain and leg swelling.    Gastrointestinal:  Negative for abdominal pain, diarrhea, nausea and vomiting.   Endocrine: Positive for polydipsia.   Genitourinary:  Positive for frequency. Negative for dysuria.   Musculoskeletal:  Positive for myalgias. Negative for back pain, neck pain and neck stiffness.   Neurological:  Positive for syncope. Negative for facial asymmetry, speech difficulty, weakness and headaches.       Physical Exam     Initial Vitals [11/18/24 0051]   BP Pulse Resp Temp SpO2   (!) 70/43 82 16 97.6 °F (36.4 °C) 96 %      MAP       --         Physical Exam    Nursing note and vitals reviewed.  Constitutional: She appears well-developed and well-nourished. She is diaphoretic. No distress.   Ill appearing, nontoxic   HENT:   Head: Normocephalic and atraumatic.   No bony scalp deformity or tenderness.  Face symmetric.  Tongue midline.   Eyes: EOM are normal. Pupils are equal, round, and reactive to light.   Neck: Neck supple.   Normal range of motion.  Cardiovascular:  Normal rate, regular rhythm and intact distal pulses.           Normal sinus rhythm.  No unilateral leg swelling or calf tenderness.  No pretibial edema.    Bedside ultrasound with hyperdynamic heart, no large pericardial effusion.   Pulmonary/Chest: Breath sounds normal. No respiratory distress.   Abdominal: Abdomen is soft. There is no abdominal tenderness.   Musculoskeletal:         General: No tenderness. Normal range of motion.      Cervical back: Normal range of motion and neck supple.      Comments: No midline spinal tenderness     Neurological: She is alert and oriented to person, place, and time. She has normal strength. GCS score is 15. GCS eye subscore is 4. GCS verbal subscore is 5. GCS motor subscore is 6.   Skin: Skin is warm.   Psychiatric: She has a normal mood and affect. Thought content normal.         ED Course   Critical Care    Date/Time: 11/18/2024 3:40 AM    Performed by: Tico Thrasher PA-C  Authorized by: Joseph Morrow MD   Direct patient critical care time: 40 minutes  Additional history critical care time: 20 minutes  Ordering / reviewing critical care time: 20 minutes  Documentation critical care time: 20 minutes  Consulting other physicians critical care time: 10 minutes  Consult with family critical care time: 10 minutes  Total critical care time (exclusive of procedural time) : 120 minutes  Critical care was necessary to treat or prevent imminent or life-threatening deterioration of the following conditions: circulatory failure, endocrine crisis, shock and sepsis.  Critical care was time spent personally by me on the following activities: development of treatment plan with patient or surrogate, discussions with consultants, interpretation of cardiac output measurements, evaluation of patient's response to treatment, examination of patient, obtaining history from patient or surrogate, ordering and performing treatments and interventions, ordering and review of laboratory studies, ordering and review of radiographic studies, pulse oximetry, re-evaluation of patient's condition and review of old charts.        Labs Reviewed   CBC W/ AUTO DIFFERENTIAL - Abnormal       Result Value    WBC 11.62      RBC 4.98      Hemoglobin 14.7      Hematocrit 41.9      MCV 84      MCH 29.5      MCHC 35.1      RDW 11.7      Platelets 337      MPV 10.3      Immature Granulocytes 0.5      Gran # (ANC) 9.6 (*)     Immature Grans (Abs) 0.06 (*)     Lymph # 1.1      Mono # 0.8      Eos # 0.0      Baso # 0.02      nRBC 0      Gran % 82.4 (*)     Lymph % 9.8 (*)     Mono % 7.1      Eosinophil % 0.0      Basophil % 0.2      Differential Method Automated     COMPREHENSIVE METABOLIC PANEL - Abnormal    Sodium 127 (*)     Potassium 3.9      Chloride 90 (*)     CO2 16 (*)     Glucose 453 (*)     BUN 16      Creatinine 1.3      Calcium 9.4      Total Protein 7.3      Albumin 3.2 (*)     Total Bilirubin 0.8      Alkaline Phosphatase 74      AST 10      ALT 15       eGFR 49 (*)     Anion Gap 21 (*)     Narrative:       GLU  critical result(s) called and verbal readback obtained from    MAYA SANCHEZ  by SAAD 11/18/2024 01:46   BETA - HYDROXYBUTYRATE, SERUM - Abnormal    Beta-Hydroxybutyrate 4.5 (*)    URINALYSIS, REFLEX TO URINE CULTURE - Abnormal    Specimen UA Urine, Clean Catch      Color, UA Yellow      Appearance, UA Hazy (*)     pH, UA 6.0      Specific Gravity, UA 1.025      Protein, UA 1+ (*)     Glucose, UA 4+ (*)     Ketones, UA 3+ (*)     Bilirubin (UA) Negative      Occult Blood UA Trace (*)     Nitrite, UA Negative      Urobilinogen, UA Negative      Leukocytes, UA 3+ (*)     Narrative:     Specimen Source->Urine   MAGNESIUM - Abnormal    Magnesium 1.4 (*)    TSH - Abnormal    TSH 6.654 (*)    B-TYPE NATRIURETIC PEPTIDE - Abnormal     (*)    URINALYSIS MICROSCOPIC - Abnormal    RBC, UA 23 (*)     WBC, UA >100 (*)     WBC Clumps, UA Rare      Bacteria Rare      Yeast, UA None      Squam Epithel, UA 4      Hyaline Casts, UA 0      Microscopic Comment SEE COMMENT      Narrative:     Specimen Source->Urine   POCT GLUCOSE - Abnormal    POCT Glucose 472 (*)    POCT GLUCOSE - Abnormal    POCT Glucose >500 (*)    ISTAT PROCEDURE - Abnormal    POC PH 7.289 (*)     POC PCO2 43.5      POC PO2 32 (*)     POC HCO3 20.9 (*)     POC BE -6 (*)     POC SATURATED O2 55      POC TCO2 22 (*)     Sample VENOUS      Site Other      Allens Test N/A     POCT GLUCOSE - Abnormal    POCT Glucose 422 (*)    POCT GLUCOSE - Abnormal    POCT Glucose 374 (*)    CULTURE, BLOOD   CULTURE, BLOOD   CULTURE, URINE   B-TYPE NATRIURETIC PEPTIDE   TROPONIN I   TSH   TROPONIN I    Troponin I 0.015     T4, FREE    Free T4 1.20     HEMOGLOBIN A1C   HEMOGLOBIN A1C   SARS-COV-2 RDRP GENE    POC Rapid COVID Negative       Acceptable Yes     POCT INFLUENZA A/B MOLECULAR    POC Molecular Influenza A Ag Negative      POC Molecular Influenza B Ag Negative       Acceptable Yes      POCT GLUCOSE MONITORING CONTINUOUS   POCT GLUCOSE MONITORING CONTINUOUS   POCT GLUCOSE MONITORING CONTINUOUS     EKG Readings: (Independently Interpreted)   Normal sinus rhythm, ventricular rate 67 beats per minute.  Short SC, prolonged QT, normal QRS duration.  No right axis deviation.  No convincing ST elevation.  Questionable for peaked T-waves.  Inverted T-wave aVL.    No convincing dysrhythmia.  No obvious ischemic change.  No convincing evidence of Brugada.  No obvious signs of HOCM.  No delta wave to suggest WPW.  No convincing signs of RV strain.       Imaging Results              X-Ray Chest AP Portable (Final result)  Result time 11/18/24 02:31:25      Final result by Rhianna Huerta MD (11/18/24 02:31:25)                   Impression:      No acute intrathoracic abnormality identified on this single radiographic view of the chest.      Electronically signed by: Rhianna Huerta MD  Date:    11/18/2024  Time:    02:31               Narrative:    EXAMINATION:  XR CHEST AP PORTABLE    CLINICAL HISTORY:  hypotension;    TECHNIQUE:  Single frontal view of the chest was performed.    COMPARISON:  None    FINDINGS:  Cardiac monitoring leads overlie the chest.  The cardiomediastinal silhouette is within normal limits. Mediastinal structures are midline.  The lungs appear symmetrically expanded without definite evidence of confluent airspace consolidation, significant volume of pleural fluid or pneumothorax.  Visualized osseous structures are intact.  Surgical clips project over the right upper quadrant.                                       CT Head Without Contrast (Final result)  Result time 11/18/24 02:55:10      Final result by Rhianna Huerta MD (11/18/24 02:55:10)                   Impression:      No CT evidence of acute intracranial abnormality. Clinical correlation and further evaluation as warranted.      Electronically signed by: Rhianna Huerta MD  Date:    11/18/2024  Time:    02:55                Narrative:    EXAMINATION:  CT HEAD WITHOUT CONTRAST    CLINICAL HISTORY:  Syncope, recurrent;    TECHNIQUE:  Low dose axial images were obtained through the head.  Coronal and sagittal reformations were also performed. Contrast was not administered.    COMPARISON:  None.    FINDINGS:  There is no acute intracranial hemorrhage, hydrocephalus, midline shift or mass effect. Gray-white matter differentiation appears maintained. The basal cisterns are patent. The visualized paranasal sinuses and mastoid air cells are clear of acute process.  The visualized bones of the calvarium demonstrate no acute osseous abnormality.                                       CT Cervical Spine Without Contrast (Final result)  Result time 11/18/24 03:02:20      Final result by Rhianna Huerta MD (11/18/24 03:02:20)                   Impression:      1. No CT evidence of acute cervical spine fracture or traumatic subluxation.  Clinical correlation and further assessment as warranted.  2. Degenerative change of the cervical spine most pronounced at the C5-C6, C6-C7 and C2-C3 levels.  Further assessment of degenerative change can be performed with nonemergent MRI if there are no patient contraindications and if clinically indicated.      Electronically signed by: Rhianna Huerta MD  Date:    11/18/2024  Time:    03:02               Narrative:    EXAMINATION:  CT CERVICAL SPINE WITHOUT CONTRAST    CLINICAL HISTORY:  Neck trauma, uncomplicated (NEXUS/CCR neg) (Age 16-64y);    TECHNIQUE:  Low dose axial images, sagittal and coronal reformations were performed though the cervical spine.  Contrast was not administered.    COMPARISON:  None    FINDINGS:  There is straightening and slight reversal of normal cervical lordosis.  There is minimal anterolisthesis of C2 on C3, presumably degenerative in etiology noting left-sided facet arthropathy at the C2-C3 level.  The facet joints articulate appropriately.  No significant prevertebral soft tissue  swelling.  Vertebral body heights appear appropriately maintained.  There is intervertebral disc height loss at the C5-C6 and C6-C7 levels with anterior osteophytosis.  There are posterior disc osteophyte complexes at these levels with subsequent canal stenosis.  Uncovertebral joint DJD at these levels result in mild neural foraminal narrowing.  The visualized skull base is intact.  Visualized soft tissue structures are within normal limits.  The visualized lung apices are free of pleural fluid or focal consolidation.                                    X-Rays:   Independently Interpreted Readings:   Chest X-Ray: Personal interpretation:  Borderline cardiomegaly, no convincing effusion, obvious pneumothorax, no dense peripheral lobar consolidation     Medications   sodium chloride 0.9% flush 10 mL (has no administration in time range)   dextrose 5 % and 0.45 % NaCl infusion (has no administration in time range)   insulin regular in 0.9 % NaCl 100 unit/100 mL (1 unit/mL) infusion (0.1 Units/kg/hr × 68 kg Intravenous No Dose/Rate Change 11/18/24 4308)   dextrose 10% bolus 250 mL 250 mL (has no administration in time range)   dextrose 10% bolus 125 mL 125 mL (has no administration in time range)   sodium chloride 0.9% flush 10 mL (has no administration in time range)   0.9%  NaCl infusion (has no administration in time range)   dextrose 5 % and 0.45 % NaCl infusion (has no administration in time range)   dextrose 50% injection 25 g (has no administration in time range)   dextrose 50% injection 12.5 g (has no administration in time range)   potassium bicarbonate disintegrating tablet 50 mEq (has no administration in time range)   cefTRIAXone injection 2 g (has no administration in time range)   magnesium sulfate 2g in water 50mL IVPB (premix) (has no administration in time range)   magnesium sulfate 2g in water 50mL IVPB (premix) (has no administration in time range)   sodium phosphate 15 mmol in D5W 250 mL IVPB (has no  administration in time range)   sodium phosphate 20.01 mmol in D5W 250 mL IVPB (has no administration in time range)   sodium phosphate 30 mmol in D5W 250 mL IVPB (has no administration in time range)   calcium gluconate 1 g in NS IVPB (premixed) (has no administration in time range)   calcium gluconate 1 g in NS IVPB (premixed) (has no administration in time range)   calcium gluconate 1 g in NS IVPB (premixed) (has no administration in time range)   lactated ringers bolus 2,000 mL (0 mLs Intravenous Stopped 11/18/24 0252)     Medical Decision Making  Differential diagnosis: CVA, dehydration, DKA, malignant arrhythmia, hyperkalemia, MINERVA, ACS, PE    Amount and/or Complexity of Data Reviewed  External Data Reviewed: notes.  Labs: ordered. Decision-making details documented in ED Course.  Radiology: ordered and independent interpretation performed. Decision-making details documented in ED Course.  ECG/medicine tests: ordered and independent interpretation performed. Decision-making details documented in ED Course.    Risk  Prescription drug management.  Decision regarding hospitalization.               ED Course as of 11/18/24 0340   Mon Nov 18, 2024   0153 Sodium correction for hyperglycemia: 135. Labs consistent with DKA.  Insulin order. [SM]   0258 Unable to find an A1c on file [SM]   0331 Initial MAP 52, currently 75 with IVF, did not have to start Levophed just yet but have ordered if continues with labile BP.  [SM]   0332 UA with infection, given Rocephin in the ED. blood cultures pending.  Patient comfortable with the admission, presented case to , accepted to IP service.  []      ED Course User Index  [SM] Tico Thrasher, THIERNO                           Clinical Impression:  Final diagnoses:  [R73.9] Hyperglycemia  [E11.10] Diabetic ketoacidosis without coma associated with type 2 diabetes mellitus (Primary)  [R55] Syncope, unspecified syncope type  [R94.31] Prolonged Q-T interval on ECG  [E03.8]  Subclinical hypothyroidism  [N12] Pyelonephritis          ED Disposition Condition    Admit Stable                       Tico Thrasher, THIERNO  11/18/24 2660

## 2024-11-18 NOTE — SUBJECTIVE & OBJECTIVE
Past Medical History:   Diagnosis Date    Back pain     Depression     DM (diabetes mellitus)     Gallstone     GERD (gastroesophageal reflux disease)     HSV infection     HTN (hypertension)     Rheumatoid arthritis, unspecified        Past Surgical History:   Procedure Laterality Date    ANKLE SURGERY       SECTION      x2    CHOLECYSTECTOMY      FOOT SURGERY      LOOP ELECTROSURGICAL EXCISION PROCEDURE (LEEP)         Review of patient's allergies indicates:  Not on File    No current facility-administered medications on file prior to encounter.     Current Outpatient Medications on File Prior to Encounter   Medication Sig    ergocalciferol (ERGOCALCIFEROL) 50,000 unit Cap Take 50,000 Units by mouth.    EScitalopram oxalate (LEXAPRO) 10 MG tablet Take 10 mg by mouth.    folic acid (FOLVITE) 1 MG tablet Take 1 mg by mouth once daily.    HYDROcodone-acetaminophen (NORCO)  mg per tablet Take 1 tablet by mouth 3 times daily as needed.    pregabalin (LYRICA) 100 MG capsule Take 1 capsule by mouth 3 (three) times daily.    tiZANidine (ZANAFLEX) 4 MG tablet Take 1 tablet in AM and 2 tablets in PM as needed.    lisinopriL (PRINIVIL,ZESTRIL) 20 MG tablet Take 20 mg by mouth.    metFORMIN (GLUCOPHAGE) 500 MG tablet Take 500 mg by mouth 2 (two) times daily with meals.     Family History    None       Tobacco Use    Smoking status: Unknown    Smokeless tobacco: Not on file   Substance and Sexual Activity    Alcohol use: Not Currently    Drug use: Not on file    Sexual activity: Not on file     Review of Systems  Objective:     Vital Signs (Most Recent):  Temp: 97.6 °F (36.4 °C) (24 005)  Pulse: 62 (24)  Resp: 17 (24)  BP: (!) 91/55 (24)  SpO2: 97 % (24 044) Vital Signs (24h Range):  Temp:  [97.6 °F (36.4 °C)] 97.6 °F (36.4 °C)  Pulse:  [52-82] 62  Resp:  [16-21] 17  SpO2:  [95 %-98 %] 97 %  BP: ()/(41-59) 91/55     Weight: 68 kg (150 lb)  There is no height or  weight on file to calculate BMI.     Physical Exam  Vitals and nursing note reviewed.   Constitutional:       General: She is not in acute distress.     Appearance: She is not ill-appearing, toxic-appearing or diaphoretic.   HENT:      Head: Normocephalic and atraumatic.      Mouth/Throat:      Mouth: Mucous membranes are dry.      Pharynx: No oropharyngeal exudate or posterior oropharyngeal erythema.   Eyes:      General: No scleral icterus.     Pupils: Pupils are equal, round, and reactive to light.   Neck:      Thyroid: No thyromegaly.   Cardiovascular:      Rate and Rhythm: Regular rhythm. Tachycardia present.      Heart sounds: No murmur heard.  Pulmonary:      Effort: Pulmonary effort is normal.      Breath sounds: Normal breath sounds. No stridor. No wheezing or rales.   Abdominal:      General: There is no distension.      Palpations: Abdomen is soft. There is no mass.      Tenderness: There is no abdominal tenderness. There is no guarding.   Musculoskeletal:         General: Normal range of motion.      Cervical back: Normal range of motion and neck supple. No rigidity.      Right lower leg: No edema.      Left lower leg: No edema.   Lymphadenopathy:      Cervical: No cervical adenopathy.   Skin:     General: Skin is warm and dry.      Capillary Refill: Capillary refill takes less than 2 seconds.      Coloration: Skin is not jaundiced.      Findings: No bruising.   Neurological:      Mental Status: She is oriented to person, place, and time. Mental status is at baseline.      GCS: GCS eye subscore is 4. GCS verbal subscore is 5. GCS motor subscore is 6.      Cranial Nerves: No cranial nerve deficit.      Motor: No weakness.   Psychiatric:         Mood and Affect: Mood normal.         Behavior: Behavior normal.              CRANIAL NERVES     CN III, IV, VI   Pupils are equal, round, and reactive to light.       Significant Labs: All pertinent labs within the past 24 hours have been reviewed.    Significant  Imaging: I have reviewed all pertinent imaging results/findings within the past 24 hours.

## 2024-11-18 NOTE — ASSESSMENT & PLAN NOTE
Patients blood pressure range in the last 24 hours was: BP  Min: 70/43  Max: 108/59.The patient's inpatient anti-hypertensive regimen is listed below:  Current Antihypertensives  , , Oral  lisinopriL tablet 20 mg, Daily, Oral    Plan  - BP is low, will hold Lisinopril  - Continue IV fluids as above

## 2024-11-18 NOTE — ASSESSMENT & PLAN NOTE
Patient with hx of Type 2 DM with non-compliance presenting with dehydration and syncope. Labs notable for positive ketones and anion gap metabolic acidosis with hyperglycemia in the ED and started on insulin drip  Triggered by ? Steroid shot or medication non-compliance    Plan:  Treat as per DKA protocol with insulin drip  BMP and VBG q4hr  Replete electrolytes PRN  ABG/VBG to assess severity of acidemia  Evaluate for trigger for DKA with - CXR, UA, Blood Cultures for infection,  Troponin, EKG for ischemia and Lipase for pancreatitis  When normalize anion gap and low stable insulin requirement for 4 hours, transition to basal bolus regimen based on 24hr insulin requirement   Start long acting insulin then stop drip 2-3 hours AFTER to bridge effect  Stop fluids and start carb-controlled diet

## 2024-11-18 NOTE — CARE UPDATE
Patient seen and examined.  See H/P, treatment and plan per Dr. Lane.  53 y/o female admitted with DKA.  Known hx of DM, just on metformin at home.  Occasional non compliance with medication.  Admitted to ICU on insulin drip and aggressive IVF hydration.  Improving MINERVA.  Improving glucose and patient feeling better.  Still with elevated AG.  Will continue insulin drip until AG closes.  Will then transition to SQ insulin and advance diet.

## 2024-11-18 NOTE — Clinical Note
Diagnosis: Diabetic ketoacidosis without coma associated with type 2 diabetes mellitus [[id number]]   Future Attending Provider: MARAH MUJICA [[id number]]   Reason for IP Medical Treatment  (Clinical interventions that can only be accomplished in the IP setting? ) :: DKA   Plans for Post-Acute care--if anticipated (pick the single best option):: A. No post acute care anticipated at this time   Special Needs:: No Special Needs [1]

## 2024-11-18 NOTE — HPI
53yo F with PMH of Type 2 DM- poorly controlled, HTN, HLD, RA, Degenerative disc disease, Chronic pain, Depression ,GERD, Chronic opiate therapy, Former smoker, presents to ED with daughter due to syncopal episode.     Patient states she began to feel unwell on Friday with fatigue and myalgias and went to an urgent care suspecting flu where she was given steroid injection.  States had a very transient episode of dyspnea that day, none since then. Even after discharge from  she had worsening fatigue, generalized weakness. Multiple falls and syncopal episodes along with poor appetite and oral intake. Admits to 2-3 episodes of syncope yesterday and this AM morning just prior to arrival, prompting ED visit; daughter states that she was woken by patient following to the floor. Patient denies any known head injury, denies headache or acute neck pain.  Denies unilateral arm or leg weakness, slurred speech, facial droop, unsteady gait, aphasia. No cough.  No odynophagia.  No rhinorrhea or congestion.  Denies any recent illness.  No abdominal pain.  No nausea vomiting.  Denies diarrhea. Admits to difficulty with weight-bearing due to lightheadedness, needing to support herself.  No history of CVA.  Daughter states that she was on the ground for approx 1 minute, eyes open, not really responding.  No apnea.  No CPR performed.  No generalized shaking movements.  No tongue biting.  No head turning.  No history of seizure disorder.  She denies any precursors chest pain, shortness of breath, prior to onset of syncopal episodes.  Denies history of syncope. Denies personal history of ACS.  Denies hyperlipidemia.  Does admit to family history of premature cardiac disease.  No personal history of CHF.  Denies orthopnea or paroxysmal nocturnal dyspnea.  No new leg swelling. She presents to ED complaining of feeling lightheaded, fatigue, generally unwell.  She does admit to possible fever, chills off and on since onset of current  complaints. She does admit to increased urinary frequency over the past 7-10d.  No dysuria, hematuria, or strong odor to urine.  Denies frequent UTIs.  Denies flank pain.  He admits to polydipsia beginning yesterday.  She denies ever having to be hospitalized for her diabetes. She was diagnosed with diabetes about 6-7 years ago, as type 2 and has been prescribed Metformin, but she struggles with taking it daily and misses multiple times. She does not recall any prior episodes of DKA or complications.    Receives Rituximab infusions for RA q4 months. Occasionally gets steroids for flares, last used it maybe 4 tp 5 months ago

## 2024-11-18 NOTE — NURSING
[facility]  Nurses Note -- 4 Eyes      11/18/2024       Skin assessed on: Q Shift      [x] No Pressure Injuries Present    [x]Prevention Measures Documented    [] Yes LDA  for Pressure Injury Previously documented     [] Yes New Pressure Injury Discovered   [] LDA for New Pressure Injury Added      Attending RN:  Valentine Pacheco RN     Second RN:  MIKAYLA Ruby

## 2024-11-18 NOTE — H&P
[facility]  [facility]  History & Physical    Patient Name: Patricia Quezada  MRN: [id number]  Patient Class: IP- Inpatient  Admission Date: 11/18/2024  Attending Physician: Jose Anderson MD   Primary Care Provider: No primary care provider on file.    Patient information was obtained from patient and ER records.     Subjective:     Principal Problem:Type 2 diabetes mellitus with ketoacidosis without coma, without long-term current use of insulin    Chief Complaint:   Chief Complaint   Patient presents with    Hypotension     Pt arrived in ED, c/o generalized weakness, dizziness since Friday. Pt reports have 3 syncopal episodes last night. Pt is hypotensive at 70/43 in triage. Pt denies any CP, SOB, abd pain or n/v/d    Dizziness        HPI: 55yo F with PMH of Type 2 DM- poorly controlled, HTN, HLD, RA, Degenerative disc disease, Chronic pain, Depression ,GERD, Chronic opiate therapy, Former smoker, presents to ED with daughter due to syncopal episode.     Patient states she began to feel unwell on Friday with fatigue and myalgias and went to an urgent care suspecting flu where she was given steroid injection.  States had a very transient episode of dyspnea that day, none since then. Even after discharge from  she had worsening fatigue, generalized weakness. Multiple falls and syncopal episodes along with poor appetite and oral intake. Admits to 2-3 episodes of syncope yesterday and this AM morning just prior to arrival, prompting ED visit; daughter states that she was woken by patient following to the floor. Patient denies any known head injury, denies headache or acute neck pain.  Denies unilateral arm or leg weakness, slurred speech, facial droop, unsteady gait, aphasia. No cough.  No odynophagia.  No rhinorrhea or congestion.  Denies any recent illness.  No abdominal pain.  No nausea vomiting.  Denies diarrhea. Admits to difficulty with weight-bearing due to lightheadedness,  needing to support herself.  No history of CVA.  Daughter states that she was on the ground for approx 1 minute, eyes open, not really responding.  No apnea.  No CPR performed.  No generalized shaking movements.  No tongue biting.  No head turning.  No history of seizure disorder.  She denies any precursors chest pain, shortness of breath, prior to onset of syncopal episodes.  Denies history of syncope. Denies personal history of ACS.  Denies hyperlipidemia.  Does admit to family history of premature cardiac disease.  No personal history of CHF.  Denies orthopnea or paroxysmal nocturnal dyspnea.  No new leg swelling. She presents to ED complaining of feeling lightheaded, fatigue, generally unwell.  She does admit to possible fever, chills off and on since onset of current complaints. She does admit to increased urinary frequency over the past 7-10d.  No dysuria, hematuria, or strong odor to urine.  Denies frequent UTIs.  Denies flank pain.  He admits to polydipsia beginning yesterday.  She denies ever having to be hospitalized for her diabetes.  She was diagnosed with diabetes about 6-7 years ago, as type 2 and has been prescribed Metformin, but she struggles with taking it daily and misses multiple times. She does not recall any prior episodes of DKA or complications.    Receives Rituximab infusions for RA q4 months. Occasionally gets steroids for flares, last used it maybe 4 tp 5 months ago       Past Medical History:   Diagnosis Date    Back pain     Depression     DM (diabetes mellitus)     Gallstone     GERD (gastroesophageal reflux disease)     HSV infection     HTN (hypertension)     Rheumatoid arthritis, unspecified        Past Surgical History:   Procedure Laterality Date    ANKLE SURGERY       SECTION      x2    CHOLECYSTECTOMY      FOOT SURGERY      LOOP ELECTROSURGICAL EXCISION PROCEDURE (LEEP)         Review of patient's allergies indicates:  Not on File    No current facility-administered  medications on file prior to encounter.     Current Outpatient Medications on File Prior to Encounter   Medication Sig    ergocalciferol (ERGOCALCIFEROL) 50,000 unit Cap Take 50,000 Units by mouth.    EScitalopram oxalate (LEXAPRO) 10 MG tablet Take 10 mg by mouth.    folic acid (FOLVITE) 1 MG tablet Take 1 mg by mouth once daily.    HYDROcodone-acetaminophen (NORCO)  mg per tablet Take 1 tablet by mouth 3 times daily as needed.    pregabalin (LYRICA) 100 MG capsule Take 1 capsule by mouth 3 (three) times daily.    tiZANidine (ZANAFLEX) 4 MG tablet Take 1 tablet in AM and 2 tablets in PM as needed.    lisinopriL (PRINIVIL,ZESTRIL) 20 MG tablet Take 20 mg by mouth.    metFORMIN (GLUCOPHAGE) 500 MG tablet Take 500 mg by mouth 2 (two) times daily with meals.     Family History    None       Tobacco Use    Smoking status: Unknown    Smokeless tobacco: Not on file   Substance and Sexual Activity    Alcohol use: Not Currently    Drug use: Not on file    Sexual activity: Not on file     Review of Systems  Objective:     Vital Signs (Most Recent):  Temp: 97.6 °F (36.4 °C) (11/18/24 0051)  Pulse: 62 (11/18/24 0441)  Resp: 17 (11/18/24 0411)  BP: (!) 91/55 (11/18/24 0441)  SpO2: 97 % (11/18/24 0441) Vital Signs (24h Range):  Temp:  [97.6 °F (36.4 °C)] 97.6 °F (36.4 °C)  Pulse:  [52-82] 62  Resp:  [16-21] 17  SpO2:  [95 %-98 %] 97 %  BP: ()/(41-59) 91/55     Weight: 68 kg (150 lb)  There is no height or weight on file to calculate BMI.     Physical Exam  Vitals and nursing note reviewed.   Constitutional:       General: She is not in acute distress.     Appearance: She is not ill-appearing, toxic-appearing or diaphoretic.   HENT:      Head: Normocephalic and atraumatic.      Mouth/Throat:      Mouth: Mucous membranes are dry.      Pharynx: No oropharyngeal exudate or posterior oropharyngeal erythema.   Eyes:      General: No scleral icterus.     Pupils: Pupils are equal, round, and reactive to light.   Neck:       Thyroid: No thyromegaly.   Cardiovascular:      Rate and Rhythm: Regular rhythm. Tachycardia present.      Heart sounds: No murmur heard.  Pulmonary:      Effort: Pulmonary effort is normal.      Breath sounds: Normal breath sounds. No stridor. No wheezing or rales.   Abdominal:      General: There is no distension.      Palpations: Abdomen is soft. There is no mass.      Tenderness: There is no abdominal tenderness. There is no guarding.   Musculoskeletal:         General: Normal range of motion.      Cervical back: Normal range of motion and neck supple. No rigidity.      Right lower leg: No edema.      Left lower leg: No edema.   Lymphadenopathy:      Cervical: No cervical adenopathy.   Skin:     General: Skin is warm and dry.      Capillary Refill: Capillary refill takes less than 2 seconds.      Coloration: Skin is not jaundiced.      Findings: No bruising.   Neurological:      Mental Status: She is oriented to person, place, and time. Mental status is at baseline.      GCS: GCS eye subscore is 4. GCS verbal subscore is 5. GCS motor subscore is 6.      Cranial Nerves: No cranial nerve deficit.      Motor: No weakness.   Psychiatric:         Mood and Affect: Mood normal.         Behavior: Behavior normal.              CRANIAL NERVES     CN III, IV, VI   Pupils are equal, round, and reactive to light.       Significant Labs: All pertinent labs within the past 24 hours have been reviewed.    Significant Imaging: I have reviewed all pertinent imaging results/findings within the past 24 hours.  Assessment/Plan:     * Type 2 diabetes mellitus with ketoacidosis without coma, without long-term current use of insulin  Patient with hx of Type 2 DM with non-compliance presenting with dehydration and syncope. Labs notable for positive ketones and anion gap metabolic acidosis with hyperglycemia in the ED and started on insulin drip  Triggered by ? Steroid shot or medication non-compliance    Plan:  Treat as per DKA protocol  with insulin drip  BMP and VBG q4hr  Replete electrolytes PRN  ABG/VBG to assess severity of acidemia  Evaluate for trigger for DKA with - CXR, UA, Blood Cultures for infection,  Troponin, EKG for ischemia and Lipase for pancreatitis  When normalize anion gap and low stable insulin requirement for 4 hours, transition to basal bolus regimen based on 24hr insulin requirement   Start long acting insulin then stop drip 2-3 hours AFTER to bridge effect  Stop fluids and start carb-controlled diet        Rheumatoid arthritis  No signs of an acute flare  Monitor      Hypertension  Patients blood pressure range in the last 24 hours was: BP  Min: 70/43  Max: 108/59.The patient's inpatient anti-hypertensive regimen is listed below:  Current Antihypertensives  , , Oral  lisinopriL tablet 20 mg, Daily, Oral    Plan  - BP is low, will hold Lisinopril  - Continue IV fluids as above      VTE Risk Mitigation (From admission, onward)           Ordered     IP VTE LOW RISK PATIENT  Once         11/18/24 0155     Place ELAINA hose  Until discontinued         11/18/24 0155     Place sequential compression device  Until discontinued         11/18/24 0155                         Violetta Lane MD  Department of Hospital Medicine  [facility] - Emergency Dept

## 2024-11-18 NOTE — Clinical Note
Elis Quezada accompanied their mother to the emergency department on 11/18/2024. They may return to work on 11/19/2024.      If you have any questions or concerns, please don't hesitate to call.      Tico Thrasher PA-C

## 2024-11-19 PROBLEM — R65.21 SEPTIC SHOCK: Status: ACTIVE | Noted: 2024-11-19

## 2024-11-19 PROBLEM — A41.9 SEPTIC SHOCK: Status: ACTIVE | Noted: 2024-11-19

## 2024-11-19 PROBLEM — R78.81 GRAM-NEGATIVE BACTEREMIA: Status: ACTIVE | Noted: 2024-11-19

## 2024-11-19 LAB
ANION GAP SERPL CALC-SCNC: 12 MMOL/L (ref 8–16)
BASOPHILS # BLD AUTO: 0.01 K/UL (ref 0–0.2)
BASOPHILS NFR BLD: 0.1 % (ref 0–1.9)
BUN SERPL-MCNC: 7 MG/DL (ref 6–20)
CALCIUM SERPL-MCNC: 9.5 MG/DL (ref 8.7–10.5)
CHLORIDE SERPL-SCNC: 100 MMOL/L (ref 95–110)
CO2 SERPL-SCNC: 25 MMOL/L (ref 23–29)
CREAT SERPL-MCNC: 0.7 MG/DL (ref 0.5–1.4)
CRP SERPL-MCNC: 55.7 MG/L (ref 0–8.2)
DIFFERENTIAL METHOD BLD: NORMAL
EOSINOPHIL # BLD AUTO: 0 K/UL (ref 0–0.5)
EOSINOPHIL NFR BLD: 0.3 % (ref 0–8)
ERYTHROCYTE [DISTWIDTH] IN BLOOD BY AUTOMATED COUNT: 11.9 % (ref 11.5–14.5)
EST. GFR  (NO RACE VARIABLE): >60 ML/MIN/1.73 M^2
GLUCOSE SERPL-MCNC: 228 MG/DL (ref 70–110)
HCT VFR BLD AUTO: 42.6 % (ref 37–48.5)
HGB BLD-MCNC: 14.8 G/DL (ref 12–16)
IMM GRANULOCYTES # BLD AUTO: 0.03 K/UL (ref 0–0.04)
IMM GRANULOCYTES NFR BLD AUTO: 0.3 % (ref 0–0.5)
LACTATE SERPL-SCNC: 0.8 MMOL/L (ref 0.5–2.2)
LYMPHOCYTES # BLD AUTO: 1.9 K/UL (ref 1–4.8)
LYMPHOCYTES NFR BLD: 22.1 % (ref 18–48)
MCH RBC QN AUTO: 29.1 PG (ref 27–31)
MCHC RBC AUTO-ENTMCNC: 34.7 G/DL (ref 32–36)
MCV RBC AUTO: 84 FL (ref 82–98)
MONOCYTES # BLD AUTO: 1 K/UL (ref 0.3–1)
MONOCYTES NFR BLD: 11.2 % (ref 4–15)
NEUTROPHILS # BLD AUTO: 5.8 K/UL (ref 1.8–7.7)
NEUTROPHILS NFR BLD: 66 % (ref 38–73)
NRBC BLD-RTO: 0 /100 WBC
PLATELET # BLD AUTO: 299 K/UL (ref 150–450)
PMV BLD AUTO: 10.2 FL (ref 9.2–12.9)
POCT GLUCOSE: 181 MG/DL (ref 70–110)
POCT GLUCOSE: 215 MG/DL (ref 70–110)
POCT GLUCOSE: 216 MG/DL (ref 70–110)
POCT GLUCOSE: 230 MG/DL (ref 70–110)
POTASSIUM SERPL-SCNC: 3.2 MMOL/L (ref 3.5–5.1)
PROCALCITONIN SERPL IA-MCNC: 0.38 NG/ML
RBC # BLD AUTO: 5.09 M/UL (ref 4–5.4)
SODIUM SERPL-SCNC: 137 MMOL/L (ref 136–145)
WBC # BLD AUTO: 8.73 K/UL (ref 3.9–12.7)

## 2024-11-19 PROCEDURE — 25000003 PHARM REV CODE 250: Performed by: HOSPITALIST

## 2024-11-19 PROCEDURE — 86140 C-REACTIVE PROTEIN: CPT | Performed by: STUDENT IN AN ORGANIZED HEALTH CARE EDUCATION/TRAINING PROGRAM

## 2024-11-19 PROCEDURE — 63600175 PHARM REV CODE 636 W HCPCS: Performed by: STUDENT IN AN ORGANIZED HEALTH CARE EDUCATION/TRAINING PROGRAM

## 2024-11-19 PROCEDURE — 87154 CUL TYP ID BLD PTHGN 6+ TRGT: CPT | Performed by: PHYSICIAN ASSISTANT

## 2024-11-19 PROCEDURE — 25000003 PHARM REV CODE 250: Performed by: STUDENT IN AN ORGANIZED HEALTH CARE EDUCATION/TRAINING PROGRAM

## 2024-11-19 PROCEDURE — 84145 PROCALCITONIN (PCT): CPT | Performed by: STUDENT IN AN ORGANIZED HEALTH CARE EDUCATION/TRAINING PROGRAM

## 2024-11-19 PROCEDURE — 85025 COMPLETE CBC W/AUTO DIFF WBC: CPT | Performed by: HOSPITALIST

## 2024-11-19 PROCEDURE — 36415 COLL VENOUS BLD VENIPUNCTURE: CPT | Performed by: HOSPITALIST

## 2024-11-19 PROCEDURE — 83605 ASSAY OF LACTIC ACID: CPT | Performed by: STUDENT IN AN ORGANIZED HEALTH CARE EDUCATION/TRAINING PROGRAM

## 2024-11-19 PROCEDURE — 36415 COLL VENOUS BLD VENIPUNCTURE: CPT | Performed by: STUDENT IN AN ORGANIZED HEALTH CARE EDUCATION/TRAINING PROGRAM

## 2024-11-19 PROCEDURE — 21400001 HC TELEMETRY ROOM

## 2024-11-19 PROCEDURE — 80048 BASIC METABOLIC PNL TOTAL CA: CPT | Performed by: HOSPITALIST

## 2024-11-19 PROCEDURE — 94761 N-INVAS EAR/PLS OXIMETRY MLT: CPT

## 2024-11-19 PROCEDURE — 63600175 PHARM REV CODE 636 W HCPCS: Performed by: HOSPITALIST

## 2024-11-19 PROCEDURE — 87040 BLOOD CULTURE FOR BACTERIA: CPT | Mod: 59 | Performed by: STUDENT IN AN ORGANIZED HEALTH CARE EDUCATION/TRAINING PROGRAM

## 2024-11-19 RX ORDER — CEFTRIAXONE 2 G/1
2 INJECTION, POWDER, FOR SOLUTION INTRAMUSCULAR; INTRAVENOUS
Status: DISCONTINUED | OUTPATIENT
Start: 2024-11-19 | End: 2024-11-21 | Stop reason: HOSPADM

## 2024-11-19 RX ORDER — POTASSIUM CHLORIDE 20 MEQ/1
40 TABLET, EXTENDED RELEASE ORAL ONCE
Status: COMPLETED | OUTPATIENT
Start: 2024-11-19 | End: 2024-11-19

## 2024-11-19 RX ORDER — MAGNESIUM SULFATE HEPTAHYDRATE 40 MG/ML
2 INJECTION, SOLUTION INTRAVENOUS ONCE
Status: COMPLETED | OUTPATIENT
Start: 2024-11-19 | End: 2024-11-19

## 2024-11-19 RX ORDER — DIPHENHYDRAMINE HYDROCHLORIDE 50 MG/ML
25 INJECTION INTRAMUSCULAR; INTRAVENOUS ONCE
Status: COMPLETED | OUTPATIENT
Start: 2024-11-19 | End: 2024-11-19

## 2024-11-19 RX ORDER — TALC
6 POWDER (GRAM) TOPICAL NIGHTLY
Status: DISCONTINUED | OUTPATIENT
Start: 2024-11-19 | End: 2024-11-21 | Stop reason: HOSPADM

## 2024-11-19 RX ADMIN — PREGABALIN 100 MG: 50 CAPSULE ORAL at 08:11

## 2024-11-19 RX ADMIN — INSULIN ASPART 4 UNITS: 100 INJECTION, SOLUTION INTRAVENOUS; SUBCUTANEOUS at 04:11

## 2024-11-19 RX ADMIN — INSULIN ASPART 4 UNITS: 100 INJECTION, SOLUTION INTRAVENOUS; SUBCUTANEOUS at 11:11

## 2024-11-19 RX ADMIN — INSULIN ASPART 5 UNITS: 100 INJECTION, SOLUTION INTRAVENOUS; SUBCUTANEOUS at 04:11

## 2024-11-19 RX ADMIN — INSULIN ASPART 4 UNITS: 100 INJECTION, SOLUTION INTRAVENOUS; SUBCUTANEOUS at 08:11

## 2024-11-19 RX ADMIN — PREGABALIN 100 MG: 50 CAPSULE ORAL at 02:11

## 2024-11-19 RX ADMIN — ESCITALOPRAM OXALATE 10 MG: 10 TABLET ORAL at 08:11

## 2024-11-19 RX ADMIN — Medication 6 MG: at 08:11

## 2024-11-19 RX ADMIN — DIPHENHYDRAMINE HYDROCHLORIDE 25 MG: 50 INJECTION INTRAMUSCULAR; INTRAVENOUS at 09:11

## 2024-11-19 RX ADMIN — HYDROCODONE BITARTRATE AND ACETAMINOPHEN 1 TABLET: 10; 325 TABLET ORAL at 10:11

## 2024-11-19 RX ADMIN — INSULIN ASPART 5 UNITS: 100 INJECTION, SOLUTION INTRAVENOUS; SUBCUTANEOUS at 11:11

## 2024-11-19 RX ADMIN — METOCLOPRAMIDE HYDROCHLORIDE 5 MG: 5 INJECTION INTRAMUSCULAR; INTRAVENOUS at 06:11

## 2024-11-19 RX ADMIN — MUPIROCIN: 20 OINTMENT TOPICAL at 08:11

## 2024-11-19 RX ADMIN — LISINOPRIL 10 MG: 5 TABLET ORAL at 08:11

## 2024-11-19 RX ADMIN — CEFTRIAXONE 2 G: 2 INJECTION, POWDER, FOR SOLUTION INTRAMUSCULAR; INTRAVENOUS at 10:11

## 2024-11-19 RX ADMIN — INSULIN GLARGINE 10 UNITS: 100 INJECTION, SOLUTION SUBCUTANEOUS at 08:11

## 2024-11-19 RX ADMIN — INSULIN ASPART 5 UNITS: 100 INJECTION, SOLUTION INTRAVENOUS; SUBCUTANEOUS at 08:11

## 2024-11-19 RX ADMIN — MAGNESIUM SULFATE HEPTAHYDRATE 2 G: 40 INJECTION, SOLUTION INTRAVENOUS at 10:11

## 2024-11-19 RX ADMIN — POTASSIUM CHLORIDE 40 MEQ: 1500 TABLET, EXTENDED RELEASE ORAL at 02:11

## 2024-11-19 NOTE — ASSESSMENT & PLAN NOTE
Sepsis 2/2 to GNR bacteremia 2/2 UTI  Shock: Yes, BP 73/41  SIRS:   WBC#: 82% granulocytosis WBC 12  TEMP: Afebrile, but on immunosuppressants   HR: wnl  RR: wnl  LA: 0.9  Initial ABx: CTX  Abx reduction as appropriate  Organ dysfunction: none  Patient will receive 30ml/kg actual body weight to calculate fluid bolus for treatment of sepsis and/or septic shock.   Perfusion exam was performed within 6 hours of septic shock presentation after bolus: Adequate tissue perfusion assessed by non-invasive monitoring   Will Start Pressors if MAP < 65  Source control achieved by: IV abx and fluids, source

## 2024-11-19 NOTE — NURSING
[facility]  Nurses Note -- 4 Eyes      11/19/2024       Skin assessed on: Q Shift      [x] No Pressure Injuries Present    [x]Prevention Measures Documented    [] Yes LDA  for Pressure Injury Previously documented     [] Yes New Pressure Injury Discovered   [] LDA for New Pressure Injury Added      Attending RN:  Jennifer Alarcon LPN     Second RN:  ALICIA Geronimo

## 2024-11-19 NOTE — CARE UPDATE
Per nurse, Positive blood culture 1 of 4 bottles drawn on 11/18 Gram negative Rods   Per review below, she is on no ABx Tx  On review, UA is c/w UTI, see below    -Started Rocephin 2g iv Q24 hours  -Repeat Blood cxs after 24 hours    Review of patient's allergies indicates:  Not on File     Blood Culture #1 **CANNOT BE ORDERED STAT**  Order: [id number]  Status: Preliminary result       Visible to patient: No (not released)       Next appt: None    Specimen Information: Peripheral, Antecubital, Left; Blood   0 Result Notes      Component 1 d ago   Blood Culture, Routine Gram stain aer bottle: Gram negative rods P   Blood Culture, Routine Results called to and read back by: OPHELIA ALVAREZ 11/19/2024  01:01 P   Resulting Agency [facility]             Specimen Collected: 11/18/24 01:06 CST Last Resulted: 11/19/24 01:01 CST      Latest Reference Range & Units 11/18/24 02:44   Specimen UA  Urine, Clean Catch   Color, UA Yellow, Straw, Kayli  Yellow   Appearance, UA Clear  Hazy !   Spec Grav UA 1.005 - 1.030  1.025   pH, UA 5.0 - 8.0  6.0   Protein, UA Negative  1+ !   Glucose, UA Negative  4+ !   Ketones, UA Negative  3+ !   Blood, UA Negative  Trace !   NITRITE UA Negative  Negative   UROBILINOGEN UA <2.0 EU/dL Negative   Bilirubin (UA) Negative  Negative   Leukocyte Esterase, UA Negative  3+ !   RBC, UA 0 - 4 /hpf 23 (H)   WBC, UA 0 - 5 /hpf >100 (H)   Bacteria, UA None-Occ /hpf Rare   Squam Epithel, UA /hpf 4   WBC Clumps, UA None-Rare  Rare        Current Facility-Administered Medications:     acetaminophen tablet 650 mg, 650 mg, Oral, Q8H PRN, Violetta Lane MD    acetaminophen tablet 650 mg, 650 mg, Oral, Q4H PRN, Violetta Lane MD    bisacodyL suppository 10 mg, 10 mg, Rectal, Daily PRN, Violetta Lane MD    calcium gluconate 1 g in NS IVPB (premixed), 1 g, Intravenous, PRN, Violetta Lane MD    calcium gluconate 1 g in NS IVPB (premixed), 2 g, Intravenous, PRN, Violetta Lane MD    calcium gluconate 1 g in NS IVPB  (premixed), 3 g, Intravenous, PRNDomingo Hafsa, MD    dextrose 10% bolus 125 mL 125 mL, 12.5 g, Intravenous, PRN, Jose Anderson MD    dextrose 10% bolus 250 mL 250 mL, 25 g, Intravenous, PRN, Jose Anderson MD    EScitalopram oxalate tablet 10 mg, 10 mg, Oral, Daily, Violetta Lane MD, 10 mg at 11/18/24 0936    glucagon (human recombinant) injection 1 mg, 1 mg, Intramuscular, PRN, Jose Anderson MD    glucose chewable tablet 16 g, 16 g, Oral, PRN, Jose Anderson MD    glucose chewable tablet 24 g, 24 g, Oral, PRNJustin Alberto M., MD    HYDROcodone-acetaminophen  mg per tablet 1 tablet, 1 tablet, Oral, Q4H PRN, Jose Anderson MD, 1 tablet at 11/18/24 2319    insulin aspart U-100 pen 0-10 Units, 0-10 Units, Subcutaneous, QID (AC + HS) PRN, Jose Anderson MD, 2 Units at 11/18/24 2051    insulin aspart U-100 pen 5 Units, 5 Units, Subcutaneous, TIDWM, Jose Anderson MD, 5 Units at 11/18/24 1557    insulin glargine U-100 (Lantus) pen 10 Units, 10 Units, Subcutaneous, QHS, Jose Anderson MD, 10 Units at 11/18/24 2052    lisinopriL tablet 10 mg, 10 mg, Oral, Daily, Jose Anderson MD, 10 mg at 11/18/24 1139    magnesium oxide tablet 800 mg, 800 mg, Oral, PRNDomingo Hafsa, MD    magnesium oxide tablet 800 mg, 800 mg, Oral, PRNDomingo Hafsa, MD    magnesium sulfate 2g in water 50mL IVPB (premix), 2 g, Intravenous, PRNDomingo Hafsa, MD    magnesium sulfate 2g in water 50mL IVPB (premix), 4 g, Intravenous, PRNDomingo Hafsa, MD    melatonin tablet 6 mg, 6 mg, Oral, Nightly PRNDomingo Hafsa, MD, 6 mg at 11/18/24 2050    metoclopramide injection 5 mg, 5 mg, Intravenous, TID AC, Jose Anderson MD    mupirocin 2 % ointment, , Nasal, BID, Jose Anderson MD, 1 g at 11/18/24 2050    naloxone 0.4 mg/mL injection 0.02 mg, 0.02 mg, Intravenous, PRN, Violetta Lane MD    ondansetron injection 4 mg, 4 mg, Intravenous, Q6H PRN, Jose Anderson MD    polyethylene glycol  packet 17 g, 17 g, Oral, Daily, Violetta Lane MD    potassium, sodium phosphates 280-160-250 mg packet 2 packet, 2 packet, Oral, PRN, Violetta Lane MD    potassium, sodium phosphates 280-160-250 mg packet 2 packet, 2 packet, Oral, PRN, Violetta Lane MD    potassium, sodium phosphates 280-160-250 mg packet 2 packet, 2 packet, Oral, PRN, Violetta Lane MD    pregabalin capsule 100 mg, 100 mg, Oral, TID, Violetta Lane MD, 100 mg at 11/18/24 2050    promethazine (PHENERGAN) 12.5 mg in 0.9% NaCl 50 mL IVPB, 12.5 mg, Intravenous, Q6H PRN, Jose Anderson MD, Stopped at 11/18/24 0919    senna-docusate 8.6-50 mg per tablet 1 tablet, 1 tablet, Oral, BID PRNDomingo Hafsa, MD    simethicone chewable tablet 80 mg, 1 tablet, Oral, QID PRN, Violetta Lane MD    sodium chloride 0.9% flush 10 mL, 10 mL, Intravenous, PRNDomingo Hafsa, MD    sodium phosphate 15 mmol in D5W 250 mL IVPB, 15 mmol, Intravenous, PRNDomingo Hafsa, MD    sodium phosphate 20.01 mmol in D5W 250 mL IVPB, 20.01 mmol, Intravenous, PRNDomingo Hafsa, MD    sodium phosphate 30 mmol in D5W 250 mL IVPB, 30 mmol, Intravenous, PRNDomingo Hafsa, MD    tiZANidine tablet 2 mg, 2 mg, Oral, Daily PRN, Violetta Lane MD, 2 mg at 11/18/24 1502

## 2024-11-19 NOTE — CONSULTS
Food & Nutrition  Education    Diet Education: Carbohydrate Controlled Diet Education  Time Spent: 30 min  Learners: Patient, Pt daughter on phone      Nutrition Education provided with handouts: Carbohydrate Counting for People with Diabetes, Choose Your Foods: Foods Lists for Diabetes, Plate Method for Diabetes      Comments: Educated patient on carb controlled diet. Pt reports to eating whatever she wants, and is unsure on how to count carbs. Educated pt on how many grams of carbs are in one CHO serving (15 g), how many CHO servings allotted per meal/snacks (60g/per, 15g/snack), and many much of each food is 15 g CHO. Discussed what pt is currently eating and diabetic friendly swaps to make such as diet sodas instead of regular and reducing portion sized of CHO foods. Asked pt if it was realistic for her to prepare more food at home instead of getting fast/convenience foods. Pt open to trying, shared that her daughter will help her with creating diabetic friendly foods. Pt interested in seeing Outpatient RD.       All questions and concerns answered. Dietitian's contact information provided.       Follow-Up: 11/26    Please Re-consult as needed        Thanks!

## 2024-11-19 NOTE — HOSPITAL COURSE
Patient admitted with Septic Shock with BP 73/41. She was not started on antibiotics on admission since she was afebrile and WBC# was not yet elevated (did have 82% granulocytosis and WBC 11.6 which could be sign of early response) and normal LA of 0.9. This may be bc the patient is immunocompromised and may not be developing an febrile response. GNR in both her urine and blood, urosepsis (presumptive E.coli in both). A1c not well controlled at 10, adjust outpt insulin. Preg test neg. Covid and flu neg.

## 2024-11-19 NOTE — PROGRESS NOTES
[facility] Medicine  Progress Note    Patient Name: Patricia Quezada  MRN: [id number]  Patient Class: IP- Inpatient   Admission Date: 11/18/2024  Length of Stay: 1 days  Attending Physician: aMrcell Kiran MD  Primary Care Provider: No primary care provider on file.        Subjective:     Principal Problem:Septic shock        HPI:  55yo F with PMH of Type 2 DM- poorly controlled, HTN, HLD, RA, Degenerative disc disease, Chronic pain, Depression ,GERD, Chronic opiate therapy, Former smoker, presents to ED with daughter due to syncopal episode.     Patient states she began to feel unwell on Friday with fatigue and myalgias and went to an urgent care suspecting flu where she was given steroid injection.  States had a very transient episode of dyspnea that day, none since then. Even after discharge from  she had worsening fatigue, generalized weakness. Multiple falls and syncopal episodes along with poor appetite and oral intake. Admits to 2-3 episodes of syncope yesterday and this AM morning just prior to arrival, prompting ED visit; daughter states that she was woken by patient following to the floor. Patient denies any known head injury, denies headache or acute neck pain.  Denies unilateral arm or leg weakness, slurred speech, facial droop, unsteady gait, aphasia. No cough.  No odynophagia.  No rhinorrhea or congestion.  Denies any recent illness.  No abdominal pain.  No nausea vomiting.  Denies diarrhea. Admits to difficulty with weight-bearing due to lightheadedness, needing to support herself.  No history of CVA.  Daughter states that she was on the ground for approx 1 minute, eyes open, not really responding.  No apnea.  No CPR performed.  No generalized shaking movements.  No tongue biting.  No head turning.  No history of seizure disorder.  She denies any precursors chest pain, shortness of breath, prior to onset of syncopal episodes.  Denies history of syncope. Denies personal history  of ACS.  Denies hyperlipidemia.  Does admit to family history of premature cardiac disease.  No personal history of CHF.  Denies orthopnea or paroxysmal nocturnal dyspnea.  No new leg swelling. She presents to ED complaining of feeling lightheaded, fatigue, generally unwell.  She does admit to possible fever, chills off and on since onset of current complaints. She does admit to increased urinary frequency over the past 7-10d.  No dysuria, hematuria, or strong odor to urine.  Denies frequent UTIs.  Denies flank pain.  He admits to polydipsia beginning yesterday.  She denies ever having to be hospitalized for her diabetes.  She was diagnosed with diabetes about 6-7 years ago, as type 2 and has been prescribed Metformin, but she struggles with taking it daily and misses multiple times. She does not recall any prior episodes of DKA or complications.    Receives Rituximab infusions for RA q4 months. Occasionally gets steroids for flares, last used it maybe 4 tp 5 months ago       Overview/Hospital Course:  Patient admitted with Septic Shock with BP 73/41. She was not started on antibiotics on admission since she was afebrile and WBC# was not yet elevated (did have 82% granulocytosis and WBC 11.6 which could be sign of early response) and normal LA of 0.9. This may be bc the patient is immunocompromised and may not be developing an febrile response. GNR in both her urine and blood, urosepsis. A1c not well controlled at 10, adjust outpt insulin. Preg test neg. Covid and flu neg.     Interval History:  NAEON.  No new issues.   CC- gen fatigue  All questions answered and updates on care given.       ROS:  General: Negative for fevers   Cardiac: Negative for chest pain   Pulmonary: Negative for wheezing  GI: Negative for abdominal distention      Vitals:    11/19/24 0452 11/19/24 0453 11/19/24 0733 11/19/24 0736   BP: (!) 158/80   (!) 168/73   BP Location:       Patient Position: Lying      Pulse: 83 76 71 85   Resp: 18   17    Temp: 98.4 °F (36.9 °C)   97.9 °F (36.6 °C)   TempSrc: Oral   Oral   SpO2: 98%   96%   Weight:       Height:              Body mass index is 25.73 kg/m².      PHYSICAL EXAM:  GENERAL APPEARANCE: alert and cooperative.     HEAD: NC/AT  CARDIAC: There is no cyanosis or pallor.   LUNGS: No apparent wheezing or stridor.  ABDOMEN: Non-distended. No guarding.  MSK: No joint erythema or tenderness.   EXTREMITIES: No significant new deformity or new joint abnormality.   NEUROLOGICAL: CN II-XII grossly intact.   SKIN: No lesions or eruptions.  PSYCHIATRIC: No tangential speech. No Hyperactive features.        Recent Results (from the past 24 hours)   POCT glucose    Collection Time: 11/18/24 10:26 AM   Result Value Ref Range    POCT Glucose 125 (H) 70 - 110 mg/dL   POCT glucose    Collection Time: 11/18/24 11:34 AM   Result Value Ref Range    POCT Glucose 109 70 - 110 mg/dL   POCT glucose    Collection Time: 11/18/24  3:56 PM   Result Value Ref Range    POCT Glucose 154 (H) 70 - 110 mg/dL   POCT glucose    Collection Time: 11/18/24  7:40 PM   Result Value Ref Range    POCT Glucose 210 (H) 70 - 110 mg/dL   CBC auto differential    Collection Time: 11/19/24  4:35 AM   Result Value Ref Range    WBC 8.73 3.90 - 12.70 K/uL    RBC 5.09 4.00 - 5.40 M/uL    Hemoglobin 14.8 12.0 - 16.0 g/dL    Hematocrit 42.6 37.0 - 48.5 %    MCV 84 82 - 98 fL    MCH 29.1 27.0 - 31.0 pg    MCHC 34.7 32.0 - 36.0 g/dL    RDW 11.9 11.5 - 14.5 %    Platelets 299 150 - 450 K/uL    MPV 10.2 9.2 - 12.9 fL    Immature Granulocytes 0.3 0.0 - 0.5 %    Gran # (ANC) 5.8 1.8 - 7.7 K/uL    Immature Grans (Abs) 0.03 0.00 - 0.04 K/uL    Lymph # 1.9 1.0 - 4.8 K/uL    Mono # 1.0 0.3 - 1.0 K/uL    Eos # 0.0 0.0 - 0.5 K/uL    Baso # 0.01 0.00 - 0.20 K/uL    nRBC 0 0 /100 WBC    Gran % 66.0 38.0 - 73.0 %    Lymph % 22.1 18.0 - 48.0 %    Mono % 11.2 4.0 - 15.0 %    Eosinophil % 0.3 0.0 - 8.0 %    Basophil % 0.1 0.0 - 1.9 %    Differential Method Automated    Basic  Metabolic Panel (BMP)    Collection Time: 11/19/24  4:35 AM   Result Value Ref Range    Sodium 137 136 - 145 mmol/L    Potassium 3.2 (L) 3.5 - 5.1 mmol/L    Chloride 100 95 - 110 mmol/L    CO2 25 23 - 29 mmol/L    Glucose 228 (H) 70 - 110 mg/dL    BUN 7 6 - 20 mg/dL    Creatinine 0.7 0.5 - 1.4 mg/dL    Calcium 9.5 8.7 - 10.5 mg/dL    Anion Gap 12 8 - 16 mmol/L    eGFR >60 >60 mL/min/1.73 m^2   POCT glucose    Collection Time: 11/19/24  7:37 AM   Result Value Ref Range    POCT Glucose 216 (H) 70 - 110 mg/dL       Microbiology Results (last 7 days)       Procedure Component Value Units Date/Time    Urine culture [[id number]]  (Abnormal) Collected: 11/18/24 0244    Order Status: Completed Specimen: Urine Updated: 11/19/24 0833     Urine Culture, Routine PRESUMPTIVE E COLI  >100,000 cfu/ml  Identification and susceptibility pending      Narrative:      Specimen Source->Urine    Rapid Organism ID by PCR (from Blood culture) [[id number]] Collected: 11/19/24 0106    Order Status: No result Updated: 11/19/24 0829    Blood Culture #1 **CANNOT BE ORDERED STAT** [[id number]]  (Abnormal) Collected: 11/18/24 0106    Order Status: Completed Specimen: Blood from Peripheral, Antecubital, Left Updated: 11/19/24 0800     Blood Culture, Routine Gram stain aer bottle: Gram negative rods      Results called to and read back by: OPHELIA ALVAREZ 11/19/2024  01:01      PRESUMPTIVE E COLI  Identification and susceptibility pending      Blood Culture #2 **CANNOT BE ORDERED STAT** [[id number]] Collected: 11/18/24 0106    Order Status: Completed Specimen: Blood from Peripheral, Antecubital, Left Updated: 11/19/24 0303     Blood Culture, Routine No Growth to date      No Growth to date             Imaging Results              X-Ray Chest AP Portable (Final result)  Result time 11/18/24 02:31:25      Final result by Rhianna Huerta MD (11/18/24 02:31:25)                   Impression:      No acute intrathoracic abnormality identified on this  single radiographic view of the chest.      Electronically signed by: Rhianna Huerta MD  Date:    11/18/2024  Time:    02:31               Narrative:    EXAMINATION:  XR CHEST AP PORTABLE    CLINICAL HISTORY:  hypotension;    TECHNIQUE:  Single frontal view of the chest was performed.    COMPARISON:  None    FINDINGS:  Cardiac monitoring leads overlie the chest.  The cardiomediastinal silhouette is within normal limits. Mediastinal structures are midline.  The lungs appear symmetrically expanded without definite evidence of confluent airspace consolidation, significant volume of pleural fluid or pneumothorax.  Visualized osseous structures are intact.  Surgical clips project over the right upper quadrant.                                       CT Head Without Contrast (Final result)  Result time 11/18/24 02:55:10      Final result by Rhianna Huerta MD (11/18/24 02:55:10)                   Impression:      No CT evidence of acute intracranial abnormality. Clinical correlation and further evaluation as warranted.      Electronically signed by: Rhianna Huerta MD  Date:    11/18/2024  Time:    02:55               Narrative:    EXAMINATION:  CT HEAD WITHOUT CONTRAST    CLINICAL HISTORY:  Syncope, recurrent;    TECHNIQUE:  Low dose axial images were obtained through the head.  Coronal and sagittal reformations were also performed. Contrast was not administered.    COMPARISON:  None.    FINDINGS:  There is no acute intracranial hemorrhage, hydrocephalus, midline shift or mass effect. Gray-white matter differentiation appears maintained. The basal cisterns are patent. The visualized paranasal sinuses and mastoid air cells are clear of acute process.  The visualized bones of the calvarium demonstrate no acute osseous abnormality.                                       CT Cervical Spine Without Contrast (Final result)  Result time 11/18/24 03:02:20      Final result by Rhianna Huerta MD (11/18/24 03:02:20)                    Impression:      1. No CT evidence of acute cervical spine fracture or traumatic subluxation.  Clinical correlation and further assessment as warranted.  2. Degenerative change of the cervical spine most pronounced at the C5-C6, C6-C7 and C2-C3 levels.  Further assessment of degenerative change can be performed with nonemergent MRI if there are no patient contraindications and if clinically indicated.      Electronically signed by: Rhianna Huerta MD  Date:    11/18/2024  Time:    03:02               Narrative:    EXAMINATION:  CT CERVICAL SPINE WITHOUT CONTRAST    CLINICAL HISTORY:  Neck trauma, uncomplicated (NEXUS/CCR neg) (Age 16-64y);    TECHNIQUE:  Low dose axial images, sagittal and coronal reformations were performed though the cervical spine.  Contrast was not administered.    COMPARISON:  None    FINDINGS:  There is straightening and slight reversal of normal cervical lordosis.  There is minimal anterolisthesis of C2 on C3, presumably degenerative in etiology noting left-sided facet arthropathy at the C2-C3 level.  The facet joints articulate appropriately.  No significant prevertebral soft tissue swelling.  Vertebral body heights appear appropriately maintained.  There is intervertebral disc height loss at the C5-C6 and C6-C7 levels with anterior osteophytosis.  There are posterior disc osteophyte complexes at these levels with subsequent canal stenosis.  Uncovertebral joint DJD at these levels result in mild neural foraminal narrowing.  The visualized skull base is intact.  Visualized soft tissue structures are within normal limits.  The visualized lung apices are free of pleural fluid or focal consolidation.                                             Assessment/Plan:      * Septic shock  Sepsis 2/2 to GNR bacteremia 2/2 UTI  Shock: Yes, BP 73/41  SIRS:   WBC#: 82% granulocytosis WBC 12  TEMP: Afebrile, but on immunosuppressants   HR: wnl  RR: wnl  LA: 0.9  Procal: pending  Initial ABx: CTX  Abx reduction  as appropriate  Organ dysfunction: none  Patient will receive 30ml/kg actual body weight to calculate fluid bolus for treatment of sepsis and/or septic shock.   Perfusion exam was performed within 6 hours of septic shock presentation after bolus: Adequate tissue perfusion assessed by non-invasive monitoring   Will Start Pressors if MAP < 65  Source control achieved by: IV abx and fluids, source     Gram-negative bacteremia  See sepsis note      Type 2 diabetes mellitus with ketoacidosis without coma, without long-term current use of insulin  Patient with hx of Type 2 DM with non-compliance presenting with dehydration and syncope. Labs notable for positive ketones and anion gap metabolic acidosis with hyperglycemia in the ED and started on insulin drip  Triggered by ? Steroid shot or medication non-compliance    Plan:  Treat as per DKA protocol with insulin drip  BMP and VBG q4hr  Replete electrolytes PRN  ABG/VBG to assess severity of acidemia  Evaluate for trigger for DKA with - CXR, UA, Blood Cultures for infection,  Troponin, EKG for ischemia and Lipase for pancreatitis  When normalize anion gap and low stable insulin requirement for 4 hours, transition to basal bolus regimen based on 24hr insulin requirement   Start long acting insulin then stop drip 2-3 hours AFTER to bridge effect  Stop fluids and start carb-controlled diet        Rheumatoid arthritis  No signs of an acute flare  Monitor      Hypertension  Patients blood pressure range in the last 24 hours was: BP  Min: 70/43  Max: 108/59.The patient's inpatient anti-hypertensive regimen is listed below:  Current Antihypertensives  , , Oral  lisinopriL tablet 20 mg, Daily, Oral    Plan  - BP is low, will hold Lisinopril  - Continue IV fluids as above      VTE Risk Mitigation (From admission, onward)           Ordered     IP VTE LOW RISK PATIENT  Once         11/18/24 0155     Place ELAINA hose  Until discontinued         11/18/24 0155     Place sequential  compression device  Until discontinued         11/18/24 0155                    Discharge Planning   REGGIE:      Code Status: Full Code   Is the patient medically ready for discharge?:     Reason for patient still in hospital (select all that apply): Patient trending condition and Treatment  Discharge Plan A: Home                  Marcell Kiran MD  Department of Hospital Medicine   [facility]

## 2024-11-20 LAB
ANION GAP SERPL CALC-SCNC: 12 MMOL/L (ref 8–16)
BACTERIA BLD CULT: ABNORMAL
BACTERIA UR CULT: ABNORMAL
BASOPHILS # BLD AUTO: 0.03 K/UL (ref 0–0.2)
BASOPHILS NFR BLD: 0.3 % (ref 0–1.9)
BUN SERPL-MCNC: 7 MG/DL (ref 6–20)
CALCIUM SERPL-MCNC: 9.4 MG/DL (ref 8.7–10.5)
CHLORIDE SERPL-SCNC: 102 MMOL/L (ref 95–110)
CO2 SERPL-SCNC: 25 MMOL/L (ref 23–29)
CREAT SERPL-MCNC: 0.7 MG/DL (ref 0.5–1.4)
DIFFERENTIAL METHOD BLD: ABNORMAL
EOSINOPHIL # BLD AUTO: 0.1 K/UL (ref 0–0.5)
EOSINOPHIL NFR BLD: 0.8 % (ref 0–8)
ERYTHROCYTE [DISTWIDTH] IN BLOOD BY AUTOMATED COUNT: 12.1 % (ref 11.5–14.5)
EST. GFR  (NO RACE VARIABLE): >60 ML/MIN/1.73 M^2
GLUCOSE SERPL-MCNC: 214 MG/DL (ref 70–110)
HCT VFR BLD AUTO: 43.2 % (ref 37–48.5)
HGB BLD-MCNC: 14.5 G/DL (ref 12–16)
IMM GRANULOCYTES # BLD AUTO: 0.05 K/UL (ref 0–0.04)
IMM GRANULOCYTES NFR BLD AUTO: 0.5 % (ref 0–0.5)
LYMPHOCYTES # BLD AUTO: 2.5 K/UL (ref 1–4.8)
LYMPHOCYTES NFR BLD: 24.2 % (ref 18–48)
MCH RBC QN AUTO: 28.3 PG (ref 27–31)
MCHC RBC AUTO-ENTMCNC: 33.6 G/DL (ref 32–36)
MCV RBC AUTO: 84 FL (ref 82–98)
MONOCYTES # BLD AUTO: 0.9 K/UL (ref 0.3–1)
MONOCYTES NFR BLD: 8.6 % (ref 4–15)
NEUTROPHILS # BLD AUTO: 6.8 K/UL (ref 1.8–7.7)
NEUTROPHILS NFR BLD: 65.6 % (ref 38–73)
NRBC BLD-RTO: 0 /100 WBC
PLATELET # BLD AUTO: 354 K/UL (ref 150–450)
PMV BLD AUTO: 10.1 FL (ref 9.2–12.9)
POCT GLUCOSE: 157 MG/DL (ref 70–110)
POCT GLUCOSE: 248 MG/DL (ref 70–110)
POCT GLUCOSE: 277 MG/DL (ref 70–110)
POCT GLUCOSE: 313 MG/DL (ref 70–110)
POTASSIUM SERPL-SCNC: 3.5 MMOL/L (ref 3.5–5.1)
RBC # BLD AUTO: 5.13 M/UL (ref 4–5.4)
SODIUM SERPL-SCNC: 139 MMOL/L (ref 136–145)
WBC # BLD AUTO: 10.31 K/UL (ref 3.9–12.7)

## 2024-11-20 PROCEDURE — 25000003 PHARM REV CODE 250: Performed by: HOSPITALIST

## 2024-11-20 PROCEDURE — 99900035 HC TECH TIME PER 15 MIN (STAT)

## 2024-11-20 PROCEDURE — 63600175 PHARM REV CODE 636 W HCPCS: Performed by: HOSPITALIST

## 2024-11-20 PROCEDURE — 36415 COLL VENOUS BLD VENIPUNCTURE: CPT | Performed by: HOSPITALIST

## 2024-11-20 PROCEDURE — 85025 COMPLETE CBC W/AUTO DIFF WBC: CPT | Performed by: HOSPITALIST

## 2024-11-20 PROCEDURE — 25000003 PHARM REV CODE 250: Performed by: STUDENT IN AN ORGANIZED HEALTH CARE EDUCATION/TRAINING PROGRAM

## 2024-11-20 PROCEDURE — 94761 N-INVAS EAR/PLS OXIMETRY MLT: CPT

## 2024-11-20 PROCEDURE — 80048 BASIC METABOLIC PNL TOTAL CA: CPT | Performed by: HOSPITALIST

## 2024-11-20 PROCEDURE — 63600175 PHARM REV CODE 636 W HCPCS: Performed by: STUDENT IN AN ORGANIZED HEALTH CARE EDUCATION/TRAINING PROGRAM

## 2024-11-20 PROCEDURE — 94799 UNLISTED PULMONARY SVC/PX: CPT

## 2024-11-20 PROCEDURE — 25000003 PHARM REV CODE 250: Performed by: INTERNAL MEDICINE

## 2024-11-20 PROCEDURE — 21400001 HC TELEMETRY ROOM

## 2024-11-20 RX ORDER — LIDOCAINE 50 MG/G
1 PATCH TOPICAL
Status: DISCONTINUED | OUTPATIENT
Start: 2024-11-20 | End: 2024-11-21 | Stop reason: HOSPADM

## 2024-11-20 RX ORDER — DIPHENHYDRAMINE HCL 25 MG
25 CAPSULE ORAL NIGHTLY
Status: DISCONTINUED | OUTPATIENT
Start: 2024-11-20 | End: 2024-11-21 | Stop reason: HOSPADM

## 2024-11-20 RX ADMIN — MUPIROCIN: 20 OINTMENT TOPICAL at 08:11

## 2024-11-20 RX ADMIN — INSULIN ASPART 4 UNITS: 100 INJECTION, SOLUTION INTRAVENOUS; SUBCUTANEOUS at 12:11

## 2024-11-20 RX ADMIN — INSULIN ASPART 6 UNITS: 100 INJECTION, SOLUTION INTRAVENOUS; SUBCUTANEOUS at 08:11

## 2024-11-20 RX ADMIN — LISINOPRIL 10 MG: 5 TABLET ORAL at 08:11

## 2024-11-20 RX ADMIN — ESCITALOPRAM OXALATE 10 MG: 10 TABLET ORAL at 08:11

## 2024-11-20 RX ADMIN — TIZANIDINE 2 MG: 2 TABLET ORAL at 06:11

## 2024-11-20 RX ADMIN — HYDROCODONE BITARTRATE AND ACETAMINOPHEN 1 TABLET: 10; 325 TABLET ORAL at 02:11

## 2024-11-20 RX ADMIN — INSULIN ASPART 5 UNITS: 100 INJECTION, SOLUTION INTRAVENOUS; SUBCUTANEOUS at 12:11

## 2024-11-20 RX ADMIN — METOCLOPRAMIDE HYDROCHLORIDE 5 MG: 5 INJECTION INTRAMUSCULAR; INTRAVENOUS at 10:11

## 2024-11-20 RX ADMIN — PREGABALIN 100 MG: 50 CAPSULE ORAL at 08:11

## 2024-11-20 RX ADMIN — METOCLOPRAMIDE HYDROCHLORIDE 5 MG: 5 INJECTION INTRAMUSCULAR; INTRAVENOUS at 06:11

## 2024-11-20 RX ADMIN — DIPHENHYDRAMINE HYDROCHLORIDE 25 MG: 25 CAPSULE ORAL at 09:11

## 2024-11-20 RX ADMIN — LIDOCAINE 1 PATCH: 50 PATCH CUTANEOUS at 09:11

## 2024-11-20 RX ADMIN — INSULIN ASPART 5 UNITS: 100 INJECTION, SOLUTION INTRAVENOUS; SUBCUTANEOUS at 04:11

## 2024-11-20 RX ADMIN — INSULIN GLARGINE 10 UNITS: 100 INJECTION, SOLUTION SUBCUTANEOUS at 08:11

## 2024-11-20 RX ADMIN — Medication 6 MG: at 08:11

## 2024-11-20 RX ADMIN — INSULIN ASPART 5 UNITS: 100 INJECTION, SOLUTION INTRAVENOUS; SUBCUTANEOUS at 08:11

## 2024-11-20 RX ADMIN — CEFTRIAXONE 2 G: 2 INJECTION, POWDER, FOR SOLUTION INTRAMUSCULAR; INTRAVENOUS at 10:11

## 2024-11-20 RX ADMIN — PREGABALIN 100 MG: 50 CAPSULE ORAL at 02:11

## 2024-11-20 RX ADMIN — HYDROCODONE BITARTRATE AND ACETAMINOPHEN 1 TABLET: 10; 325 TABLET ORAL at 06:11

## 2024-11-20 RX ADMIN — METOCLOPRAMIDE HYDROCHLORIDE 5 MG: 5 INJECTION INTRAMUSCULAR; INTRAVENOUS at 04:11

## 2024-11-20 NOTE — NURSING
Scheduled medications given. IV saline lock. No complaints. Vitals assessed. Accu checks w/ insulin coverage. Pt on room air; no distress noted. Safety measures maintained. Will cont to monitor.

## 2024-11-20 NOTE — PLAN OF CARE
Problem: Adult Inpatient Plan of Care  Goal: Plan of Care Review  Outcome: Progressing  Goal: Patient-Specific Goal (Individualized)  Outcome: Progressing  Goal: Absence of Hospital-Acquired Illness or Injury  Outcome: Progressing  Goal: Optimal Comfort and Wellbeing  Outcome: Progressing  Goal: Readiness for Transition of Care  Outcome: Progressing     Problem: Infection  Goal: Absence of Infection Signs and Symptoms  Outcome: Progressing     Problem: Diabetes Comorbidity  Goal: Blood Glucose Level Within Targeted Range  Outcome: Progressing     Problem: Diabetic Ketoacidosis  Goal: Optimal Coping  Outcome: Progressing  Goal: Fluid and Electrolyte Balance with Absence of Ketosis  Outcome: Progressing     Problem: Sepsis/Septic Shock  Goal: Optimal Coping  Outcome: Progressing  Goal: Absence of Bleeding  Outcome: Progressing  Goal: Blood Glucose Level Within Targeted Range  Outcome: Progressing  Goal: Absence of Infection Signs and Symptoms  Outcome: Progressing  Goal: Optimal Nutrition Intake  Outcome: Progressing

## 2024-11-20 NOTE — NURSING
Patient stated pain was 8/10. Gave prn pain med. When reassessed, patient stated pain was 5/10. Moved patient from 421 to 414. Patient took a shower. Bed in lowest position, wheels locked, call bell in reach, side rails up x2.

## 2024-11-20 NOTE — NURSING
Patient in bed. Call button within reach. Had pain medication this morning, denies distress. Has sprites at bedside, blood sugar elevated, prn insulin ordered. No ss hyperglycemia.

## 2024-11-20 NOTE — PROGRESS NOTES
[facility] Medicine  Progress Note    Patient Name: Patricia Quezada  MRN: [id number]  Patient Class: IP- Inpatient   Admission Date: 11/18/2024  Length of Stay: 2 days  Attending Physician: Marcell Kiran MD  Primary Care Provider: No primary care provider on file.        Subjective:     Principal Problem:Septic shock        HPI:  53yo F with PMH of Type 2 DM- poorly controlled, HTN, HLD, RA, Degenerative disc disease, Chronic pain, Depression ,GERD, Chronic opiate therapy, Former smoker, presents to ED with daughter due to syncopal episode.     Patient states she began to feel unwell on Friday with fatigue and myalgias and went to an urgent care suspecting flu where she was given steroid injection.  States had a very transient episode of dyspnea that day, none since then. Even after discharge from  she had worsening fatigue, generalized weakness. Multiple falls and syncopal episodes along with poor appetite and oral intake. Admits to 2-3 episodes of syncope yesterday and this AM morning just prior to arrival, prompting ED visit; daughter states that she was woken by patient following to the floor. Patient denies any known head injury, denies headache or acute neck pain.  Denies unilateral arm or leg weakness, slurred speech, facial droop, unsteady gait, aphasia. No cough.  No odynophagia.  No rhinorrhea or congestion.  Denies any recent illness.  No abdominal pain.  No nausea vomiting.  Denies diarrhea. Admits to difficulty with weight-bearing due to lightheadedness, needing to support herself.  No history of CVA.  Daughter states that she was on the ground for approx 1 minute, eyes open, not really responding.  No apnea.  No CPR performed.  No generalized shaking movements.  No tongue biting.  No head turning.  No history of seizure disorder.  She denies any precursors chest pain, shortness of breath, prior to onset of syncopal episodes.  Denies history of syncope. Denies personal history  of ACS.  Denies hyperlipidemia.  Does admit to family history of premature cardiac disease.  No personal history of CHF.  Denies orthopnea or paroxysmal nocturnal dyspnea.  No new leg swelling. She presents to ED complaining of feeling lightheaded, fatigue, generally unwell.  She does admit to possible fever, chills off and on since onset of current complaints. She does admit to increased urinary frequency over the past 7-10d.  No dysuria, hematuria, or strong odor to urine.  Denies frequent UTIs.  Denies flank pain.  He admits to polydipsia beginning yesterday.  She denies ever having to be hospitalized for her diabetes. She was diagnosed with diabetes about 6-7 years ago, as type 2 and has been prescribed Metformin, but she struggles with taking it daily and misses multiple times. She does not recall any prior episodes of DKA or complications.    Receives Rituximab infusions for RA q4 months. Occasionally gets steroids for flares, last used it maybe 4 tp 5 months ago       Overview/Hospital Course:  Patient admitted with Septic Shock with BP 73/41. She was not started on antibiotics on admission since she was afebrile and WBC# was not yet elevated (did have 82% granulocytosis and WBC 11.6 which could be sign of early response) and normal LA of 0.9. This may be bc the patient is immunocompromised and may not be developing an febrile response. GNR in both her urine and blood, urosepsis (presumptive E.coli in both). A1c not well controlled at 10, adjust outpt insulin. Preg test neg. Covid and flu neg.     Interval History:  NAEON.  No new issues.   CC- gen fatigue  All questions answered and updates on care given.       ROS:  General: Negative for fevers   Cardiac: Negative for chest pain   Pulmonary: Negative for wheezing  GI: Negative for abdominal distention      Vitals:    11/20/24 0626 11/20/24 0702 11/20/24 0729 11/20/24 0735   BP:  125/77     BP Location:       Patient Position:  Sitting     Pulse:  106 87 97    Resp: 18 20  18   Temp:  97.9 °F (36.6 °C)     TempSrc:  Oral     SpO2:  (!) 94%  95%   Weight:       Height:              Body mass index is 25.73 kg/m².      PHYSICAL EXAM:  GENERAL APPEARANCE: alert and cooperative.     HEAD: NC/AT  CARDIAC: There is no cyanosis or pallor.   LUNGS: No apparent wheezing or stridor.  ABDOMEN: Non-distended. No guarding.  MSK: No joint erythema or tenderness.   EXTREMITIES: No significant new deformity or new joint abnormality.   NEUROLOGICAL: CN II-XII grossly intact.   SKIN: No lesions or eruptions.  PSYCHIATRIC: No tangential speech. No Hyperactive features.        Recent Results (from the past 24 hours)   Lactic Acid, Plasma    Collection Time: 11/19/24 10:42 AM   Result Value Ref Range    Lactate (Lactic Acid) 0.8 0.5 - 2.2 mmol/L   Procalcitonin    Collection Time: 11/19/24 10:42 AM   Result Value Ref Range    Procalcitonin 0.38 (H) <0.25 ng/mL   C-Reactive Protein    Collection Time: 11/19/24 10:42 AM   Result Value Ref Range    CRP 55.7 (H) 0.0 - 8.2 mg/L   Blood culture    Collection Time: 11/19/24 10:42 AM    Specimen: Peripheral, Hand, Right; Blood   Result Value Ref Range    Blood Culture, Routine No Growth to date    Blood culture    Collection Time: 11/19/24 10:49 AM    Specimen: Blood   Result Value Ref Range    Blood Culture, Routine No Growth to date    POCT glucose    Collection Time: 11/19/24 11:41 AM   Result Value Ref Range    POCT Glucose 230 (H) 70 - 110 mg/dL   POCT glucose    Collection Time: 11/19/24  4:27 PM   Result Value Ref Range    POCT Glucose 215 (H) 70 - 110 mg/dL   POCT glucose    Collection Time: 11/19/24  7:30 PM   Result Value Ref Range    POCT Glucose 181 (H) 70 - 110 mg/dL   CBC auto differential    Collection Time: 11/20/24  4:06 AM   Result Value Ref Range    WBC 10.31 3.90 - 12.70 K/uL    RBC 5.13 4.00 - 5.40 M/uL    Hemoglobin 14.5 12.0 - 16.0 g/dL    Hematocrit 43.2 37.0 - 48.5 %    MCV 84 82 - 98 fL    MCH 28.3 27.0 - 31.0 pg    MCHC  33.6 32.0 - 36.0 g/dL    RDW 12.1 11.5 - 14.5 %    Platelets 354 150 - 450 K/uL    MPV 10.1 9.2 - 12.9 fL    Immature Granulocytes 0.5 0.0 - 0.5 %    Gran # (ANC) 6.8 1.8 - 7.7 K/uL    Immature Grans (Abs) 0.05 (H) 0.00 - 0.04 K/uL    Lymph # 2.5 1.0 - 4.8 K/uL    Mono # 0.9 0.3 - 1.0 K/uL    Eos # 0.1 0.0 - 0.5 K/uL    Baso # 0.03 0.00 - 0.20 K/uL    nRBC 0 0 /100 WBC    Gran % 65.6 38.0 - 73.0 %    Lymph % 24.2 18.0 - 48.0 %    Mono % 8.6 4.0 - 15.0 %    Eosinophil % 0.8 0.0 - 8.0 %    Basophil % 0.3 0.0 - 1.9 %    Differential Method Automated    Basic Metabolic Panel (BMP)    Collection Time: 11/20/24  4:06 AM   Result Value Ref Range    Sodium 139 136 - 145 mmol/L    Potassium 3.5 3.5 - 5.1 mmol/L    Chloride 102 95 - 110 mmol/L    CO2 25 23 - 29 mmol/L    Glucose 214 (H) 70 - 110 mg/dL    BUN 7 6 - 20 mg/dL    Creatinine 0.7 0.5 - 1.4 mg/dL    Calcium 9.4 8.7 - 10.5 mg/dL    Anion Gap 12 8 - 16 mmol/L    eGFR >60 >60 mL/min/1.73 m^2   POCT glucose    Collection Time: 11/20/24  7:01 AM   Result Value Ref Range    POCT Glucose 277 (H) 70 - 110 mg/dL       Microbiology Results (last 7 days)       Procedure Component Value Units Date/Time    Blood Culture #2 **CANNOT BE ORDERED STAT** [[id number]] Collected: 11/18/24 0106    Order Status: Completed Specimen: Blood from Peripheral, Antecubital, Left Updated: 11/20/24 0303     Blood Culture, Routine No Growth to date      No Growth to date      No Growth to date    Blood culture [[id number]] Collected: 11/19/24 1049    Order Status: Completed Specimen: Blood Updated: 11/19/24 1912     Blood Culture, Routine No Growth to date    Blood culture [[id number]] Collected: 11/19/24 1042    Order Status: Completed Specimen: Blood from Peripheral, Hand, Right Updated: 11/19/24 1912     Blood Culture, Routine No Growth to date    Rapid Organism ID by PCR (from Blood culture) [[id number]] Collected: 11/19/24 0106    Order Status: No result Updated: 11/19/24 1159    Urine  culture [[id number]]  (Abnormal) Collected: 11/18/24 0244    Order Status: Completed Specimen: Urine Updated: 11/19/24 0833     Urine Culture, Routine PRESUMPTIVE E COLI  >100,000 cfu/ml  Identification and susceptibility pending      Narrative:      Specimen Source->Urine    Blood Culture #1 **CANNOT BE ORDERED STAT** [[id number]]  (Abnormal) Collected: 11/18/24 0106    Order Status: Completed Specimen: Blood from Peripheral, Antecubital, Left Updated: 11/19/24 0800     Blood Culture, Routine Gram stain aer bottle: Gram negative rods      Results called to and read back by: OPHELIA ALVAREZ 11/19/2024  01:01      PRESUMPTIVE E COLI  Identification and susceptibility pending               Imaging Results              X-Ray Chest AP Portable (Final result)  Result time 11/18/24 02:31:25      Final result by Rhianna Huerta MD (11/18/24 02:31:25)                   Impression:      No acute intrathoracic abnormality identified on this single radiographic view of the chest.      Electronically signed by: Rhianna Huerta MD  Date:    11/18/2024  Time:    02:31               Narrative:    EXAMINATION:  XR CHEST AP PORTABLE    CLINICAL HISTORY:  hypotension;    TECHNIQUE:  Single frontal view of the chest was performed.    COMPARISON:  None    FINDINGS:  Cardiac monitoring leads overlie the chest.  The cardiomediastinal silhouette is within normal limits. Mediastinal structures are midline.  The lungs appear symmetrically expanded without definite evidence of confluent airspace consolidation, significant volume of pleural fluid or pneumothorax.  Visualized osseous structures are intact.  Surgical clips project over the right upper quadrant.                                       CT Head Without Contrast (Final result)  Result time 11/18/24 02:55:10      Final result by Rhianna Huerta MD (11/18/24 02:55:10)                   Impression:      No CT evidence of acute intracranial abnormality. Clinical correlation and further  evaluation as warranted.      Electronically signed by: Rhianna Huerta MD  Date:    11/18/2024  Time:    02:55               Narrative:    EXAMINATION:  CT HEAD WITHOUT CONTRAST    CLINICAL HISTORY:  Syncope, recurrent;    TECHNIQUE:  Low dose axial images were obtained through the head.  Coronal and sagittal reformations were also performed. Contrast was not administered.    COMPARISON:  None.    FINDINGS:  There is no acute intracranial hemorrhage, hydrocephalus, midline shift or mass effect. Gray-white matter differentiation appears maintained. The basal cisterns are patent. The visualized paranasal sinuses and mastoid air cells are clear of acute process.  The visualized bones of the calvarium demonstrate no acute osseous abnormality.                                       CT Cervical Spine Without Contrast (Final result)  Result time 11/18/24 03:02:20      Final result by Rhianna Huerta MD (11/18/24 03:02:20)                   Impression:      1. No CT evidence of acute cervical spine fracture or traumatic subluxation.  Clinical correlation and further assessment as warranted.  2. Degenerative change of the cervical spine most pronounced at the C5-C6, C6-C7 and C2-C3 levels.  Further assessment of degenerative change can be performed with nonemergent MRI if there are no patient contraindications and if clinically indicated.      Electronically signed by: Rhianna Huerta MD  Date:    11/18/2024  Time:    03:02               Narrative:    EXAMINATION:  CT CERVICAL SPINE WITHOUT CONTRAST    CLINICAL HISTORY:  Neck trauma, uncomplicated (NEXUS/CCR neg) (Age 16-64y);    TECHNIQUE:  Low dose axial images, sagittal and coronal reformations were performed though the cervical spine.  Contrast was not administered.    COMPARISON:  None    FINDINGS:  There is straightening and slight reversal of normal cervical lordosis.  There is minimal anterolisthesis of C2 on C3, presumably degenerative in etiology noting left-sided facet  arthropathy at the C2-C3 level.  The facet joints articulate appropriately.  No significant prevertebral soft tissue swelling.  Vertebral body heights appear appropriately maintained.  There is intervertebral disc height loss at the C5-C6 and C6-C7 levels with anterior osteophytosis.  There are posterior disc osteophyte complexes at these levels with subsequent canal stenosis.  Uncovertebral joint DJD at these levels result in mild neural foraminal narrowing.  The visualized skull base is intact.  Visualized soft tissue structures are within normal limits.  The visualized lung apices are free of pleural fluid or focal consolidation.                                             Assessment/Plan:      * Septic shock  Sepsis 2/2 to GNR bacteremia 2/2 UTI  Shock: Yes, BP 73/41  SIRS:   WBC#: 82% granulocytosis WBC 12  TEMP: Afebrile, but on immunosuppressants   HR: wnl  RR: wnl  LA: 0.9  Initial ABx: CTX  Abx reduction as appropriate  Organ dysfunction: none  Patient will receive 30ml/kg actual body weight to calculate fluid bolus for treatment of sepsis and/or septic shock.   Perfusion exam was performed within 6 hours of septic shock presentation after bolus: Adequate tissue perfusion assessed by non-invasive monitoring   Will Start Pressors if MAP < 65  Source control achieved by: IV abx and fluids, source     Gram-negative bacteremia  See sepsis note      Type 2 diabetes mellitus with ketoacidosis without coma, without long-term current use of insulin  Patient with hx of Type 2 DM with non-compliance presenting with dehydration and syncope. Labs notable for positive ketones and anion gap metabolic acidosis with hyperglycemia in the ED and started on insulin drip  Triggered by ? Steroid shot or medication non-compliance    Plan:  Treat as per DKA protocol with insulin drip  BMP and VBG q4hr  Replete electrolytes PRN  ABG/VBG to assess severity of acidemia  Evaluate for trigger for DKA with - CXR, UA, Blood Cultures for  infection,  Troponin, EKG for ischemia and Lipase for pancreatitis  When normalize anion gap and low stable insulin requirement for 4 hours, transition to basal bolus regimen based on 24hr insulin requirement   Start long acting insulin then stop drip 2-3 hours AFTER to bridge effect  Stop fluids and start carb-controlled diet        Rheumatoid arthritis  No signs of an acute flare  Monitor      Hypertension  Patients blood pressure range in the last 24 hours was: BP  Min: 70/43  Max: 108/59.The patient's inpatient anti-hypertensive regimen is listed below:  Current Antihypertensives  , , Oral  lisinopriL tablet 20 mg, Daily, Oral    Plan  - BP is low, will hold Lisinopril  - Continue IV fluids as above      VTE Risk Mitigation (From admission, onward)           Ordered     IP VTE LOW RISK PATIENT  Once         11/18/24 0155     Place ELAINA hose  Until discontinued         11/18/24 0155     Place sequential compression device  Until discontinued         11/18/24 0155                    Discharge Planning   REGGIE:      Code Status: Full Code   Is the patient medically ready for discharge?:     Reason for patient still in hospital (select all that apply): Patient trending condition and Treatment  Discharge Plan A: Home                  Marcell Kiran MD  Department of Hospital Medicine   [facility] [place] [facility]

## 2024-11-20 NOTE — NURSING
Pt ambulated to bathroom, remained free from fall/injury. Tele #[id number] replaced back on pt. Pt complaint of discomfort /soreness to hip. Safety measures maintained. Will cont to monitor

## 2024-11-21 ENCOUNTER — TELEPHONE (OUTPATIENT)
Dept: ENDOCRINOLOGY | Facility: CLINIC | Age: 54
End: 2024-11-21
Payer: COMMERCIAL

## 2024-11-21 VITALS
OXYGEN SATURATION: 97 % | WEIGHT: 149.94 LBS | SYSTOLIC BLOOD PRESSURE: 133 MMHG | RESPIRATION RATE: 18 BRPM | BODY MASS INDEX: 25.6 KG/M2 | TEMPERATURE: 98 F | DIASTOLIC BLOOD PRESSURE: 82 MMHG | HEIGHT: 64 IN | HEART RATE: 94 BPM

## 2024-11-21 LAB
ANION GAP SERPL CALC-SCNC: 10 MMOL/L (ref 8–16)
BASOPHILS # BLD AUTO: 0.04 K/UL (ref 0–0.2)
BASOPHILS NFR BLD: 0.4 % (ref 0–1.9)
BUN SERPL-MCNC: 14 MG/DL (ref 6–20)
CALCIUM SERPL-MCNC: 8.9 MG/DL (ref 8.7–10.5)
CHLORIDE SERPL-SCNC: 102 MMOL/L (ref 95–110)
CO2 SERPL-SCNC: 28 MMOL/L (ref 23–29)
CREAT SERPL-MCNC: 0.7 MG/DL (ref 0.5–1.4)
DIFFERENTIAL METHOD BLD: NORMAL
EOSINOPHIL # BLD AUTO: 0.2 K/UL (ref 0–0.5)
EOSINOPHIL NFR BLD: 1.7 % (ref 0–8)
ERYTHROCYTE [DISTWIDTH] IN BLOOD BY AUTOMATED COUNT: 12.1 % (ref 11.5–14.5)
EST. GFR  (NO RACE VARIABLE): >60 ML/MIN/1.73 M^2
GLUCOSE SERPL-MCNC: 269 MG/DL (ref 70–110)
HCT VFR BLD AUTO: 40.5 % (ref 37–48.5)
HGB BLD-MCNC: 13.8 G/DL (ref 12–16)
IMM GRANULOCYTES # BLD AUTO: 0.04 K/UL (ref 0–0.04)
IMM GRANULOCYTES NFR BLD AUTO: 0.4 % (ref 0–0.5)
LYMPHOCYTES # BLD AUTO: 3 K/UL (ref 1–4.8)
LYMPHOCYTES NFR BLD: 29.6 % (ref 18–48)
MCH RBC QN AUTO: 29 PG (ref 27–31)
MCHC RBC AUTO-ENTMCNC: 34.1 G/DL (ref 32–36)
MCV RBC AUTO: 85 FL (ref 82–98)
MONOCYTES # BLD AUTO: 0.8 K/UL (ref 0.3–1)
MONOCYTES NFR BLD: 7.7 % (ref 4–15)
NEUTROPHILS # BLD AUTO: 6 K/UL (ref 1.8–7.7)
NEUTROPHILS NFR BLD: 60.2 % (ref 38–73)
NRBC BLD-RTO: 0 /100 WBC
PLATELET # BLD AUTO: 377 K/UL (ref 150–450)
PMV BLD AUTO: 9.5 FL (ref 9.2–12.9)
POCT GLUCOSE: 239 MG/DL (ref 70–110)
POCT GLUCOSE: 250 MG/DL (ref 70–110)
POCT GLUCOSE: 274 MG/DL (ref 70–110)
POTASSIUM SERPL-SCNC: 3.7 MMOL/L (ref 3.5–5.1)
RBC # BLD AUTO: 4.76 M/UL (ref 4–5.4)
SODIUM SERPL-SCNC: 140 MMOL/L (ref 136–145)
WBC # BLD AUTO: 10.01 K/UL (ref 3.9–12.7)

## 2024-11-21 PROCEDURE — 80048 BASIC METABOLIC PNL TOTAL CA: CPT | Performed by: HOSPITALIST

## 2024-11-21 PROCEDURE — 63600175 PHARM REV CODE 636 W HCPCS: Performed by: HOSPITALIST

## 2024-11-21 PROCEDURE — 25000003 PHARM REV CODE 250: Performed by: HOSPITALIST

## 2024-11-21 PROCEDURE — 36415 COLL VENOUS BLD VENIPUNCTURE: CPT | Performed by: HOSPITALIST

## 2024-11-21 PROCEDURE — 85025 COMPLETE CBC W/AUTO DIFF WBC: CPT | Performed by: HOSPITALIST

## 2024-11-21 PROCEDURE — 25000003 PHARM REV CODE 250: Performed by: STUDENT IN AN ORGANIZED HEALTH CARE EDUCATION/TRAINING PROGRAM

## 2024-11-21 PROCEDURE — 63600175 PHARM REV CODE 636 W HCPCS: Performed by: STUDENT IN AN ORGANIZED HEALTH CARE EDUCATION/TRAINING PROGRAM

## 2024-11-21 RX ORDER — CIPROFLOXACIN 500 MG/1
500 TABLET ORAL EVERY 12 HOURS
Qty: 20 TABLET | Refills: 0 | Status: SHIPPED | OUTPATIENT
Start: 2024-11-21 | End: 2024-12-01

## 2024-11-21 RX ADMIN — HYDROCODONE BITARTRATE AND ACETAMINOPHEN 1 TABLET: 10; 325 TABLET ORAL at 10:11

## 2024-11-21 RX ADMIN — INSULIN ASPART 5 UNITS: 100 INJECTION, SOLUTION INTRAVENOUS; SUBCUTANEOUS at 11:11

## 2024-11-21 RX ADMIN — LISINOPRIL 10 MG: 5 TABLET ORAL at 08:11

## 2024-11-21 RX ADMIN — MUPIROCIN: 20 OINTMENT TOPICAL at 08:11

## 2024-11-21 RX ADMIN — CEFTRIAXONE 2 G: 2 INJECTION, POWDER, FOR SOLUTION INTRAMUSCULAR; INTRAVENOUS at 10:11

## 2024-11-21 RX ADMIN — METOCLOPRAMIDE HYDROCHLORIDE 5 MG: 5 INJECTION INTRAMUSCULAR; INTRAVENOUS at 10:11

## 2024-11-21 RX ADMIN — INSULIN ASPART 4 UNITS: 100 INJECTION, SOLUTION INTRAVENOUS; SUBCUTANEOUS at 08:11

## 2024-11-21 RX ADMIN — PREGABALIN 100 MG: 50 CAPSULE ORAL at 02:11

## 2024-11-21 RX ADMIN — HYDROCODONE BITARTRATE AND ACETAMINOPHEN 1 TABLET: 10; 325 TABLET ORAL at 12:11

## 2024-11-21 RX ADMIN — INSULIN ASPART 5 UNITS: 100 INJECTION, SOLUTION INTRAVENOUS; SUBCUTANEOUS at 08:11

## 2024-11-21 RX ADMIN — PREGABALIN 100 MG: 50 CAPSULE ORAL at 08:11

## 2024-11-21 RX ADMIN — METOCLOPRAMIDE HYDROCHLORIDE 5 MG: 5 INJECTION INTRAMUSCULAR; INTRAVENOUS at 07:11

## 2024-11-21 RX ADMIN — ESCITALOPRAM OXALATE 10 MG: 10 TABLET ORAL at 08:11

## 2024-11-21 RX ADMIN — INSULIN ASPART 4 UNITS: 100 INJECTION, SOLUTION INTRAVENOUS; SUBCUTANEOUS at 11:11

## 2024-11-21 NOTE — PLAN OF CARE
Today's Date: 11/21/2024   Patient Name: Patricia Quezada   YOB: 1970         [facility] Medicine Dept.    [facility] [address] [place] [address]  Phone: [phone]     Patricia Quezada has been hospitalized at the [facility], admitted on 11/18/2024 and discharged on 11/21/2024. Patient may return on Monday 11/25/24 without restrictions.     Kindly excuse the patient from work duties during their hospital stay & recovery.       Please contact us, if you have any questions about the length of stay or recovery time; however, details of the patient's medical history will remain confidential.            ________________________  Marcell Kiran MD  Date Signed: 11/21/2024

## 2024-11-21 NOTE — DISCHARGE INSTRUCTIONS
Take ciprofloxacin twice daily for 10 more days  Dont take zanaflex while on this antibiotic  Go to PCP as needed      Thank you for trusting [facility] and me with your care.  We are honored that you entrusted us with your healthcare needs. Your satisfaction is very important to us and we hope you have been very pleased with your experience at [facility]. After your discharge you may receive a survey asking you to rate your hospital experience- Please help us by completing this survey. We hope that you have received the very best care possible during your hospitalization at [facility], as your satisfaction is our top priority.    Let me know if there is anything more I can do!!              Marcell Kiran MD        Medical Director        Section Head of         Board-Certified IM Attending      PATIENT GENERAL DISCHARGE INFORMATION   Things that YOU are responsible for to Manage Your Care At Home:  1. Getting your prescriptions filled.  2. Taking you medications as directed. (DO NOT MISS ANY DOSES!)  3. Going to your follow-up doctor appointments.                 *This is important because it allows the doctor to monitor your progress and make changes.      If no one calls you for your appointments, please call ([phone]) and reschedule this appointment.   After discharge, if you need assistance, you can call [facility] Care Line for 24/7 assistance at [phone]  If you are experience any signs or symptoms, Call your doctor or Call [phone] and come to your nearest Emergency Room.    You should receive a call from [facility] within 48-72 hours to help manage your care after discharge.   Please try to make sure that you answer your phone for this important phone call.

## 2024-11-21 NOTE — NURSING
Pt laying supine in bed, asleep. Fast food on table. Pleasant , easy to arouse. A&O x4. SCDs applied. Medication review given. Tolerated meds well. Pt ambulated to the bathroom. No BM. Pt states she doesn't remember when her last BM was. Complaining of pain in L Leg due to Rheumatoid Arthritis. Requested Lidocaine patch. Also requested benadryl for sleep aid, MD notified. Bed in lowest position, personal items and call light within reach, instructed to call for help if needed.    [facility]  Nurses Note -- 4 Eyes        8/13/2024         Skin assessed on: Q Shift        [x] No Pressure Injuries Present                 [x]Prevention Measures Documented     [] Yes LDA  for Pressure Injury Previously documented      [] Yes New Pressure Injury Discovered              [] LDA for New Pressure Injury Added        Attending RN:  Tuyet Hill LPN      Second RN:  MIKAYLA Weinstein

## 2024-11-21 NOTE — PLAN OF CARE
Lying in hosp bed. Call button within reach. Prn pain medication on shift for left leg pain. Ambulates in room. No distress noted.   Problem: Adult Inpatient Plan of Care  Goal: Plan of Care Review  Outcome: Progressing  Goal: Patient-Specific Goal (Individualized)  Outcome: Progressing  Goal: Absence of Hospital-Acquired Illness or Injury  Outcome: Progressing  Goal: Optimal Comfort and Wellbeing  Outcome: Progressing  Goal: Readiness for Transition of Care  Outcome: Progressing

## 2024-11-21 NOTE — CARE UPDATE
Per nurse, pt is asking for a lidocaine patch to place behind L lower leg. pt states being in pain due to rheumatoid arthritis flare up. pt also asking for benedryl to help sleep although she was given melatonin 1hr ago as well as being asleep upon entering pt room. also has a history of opioid type dependence.     -Added Lidocaine patch  -Ordered Benadryl 25mg po QHS, now

## 2024-11-21 NOTE — DISCHARGE SUMMARY
[facility]  Discharge Summary      Patient Name: Patricia Quezada  MRN: [id number]  SHANNON: [id number]  Patient Class: IP- Inpatient  Admission Date: 11/18/2024  Hospital Length of Stay: 3 days  Discharge Date and Time:  11/21/2024 8:57 AM  Attending Physician: Marcell Kiran MD   Discharging Provider: Marcell Kiran MD  Primary Care Provider: No primary care provider on file.    Primary Care Team: Networked reference to record PCT     HPI:   53yo F with PMH of Type 2 DM- poorly controlled, HTN, HLD, RA, Degenerative disc disease, Chronic pain, Depression ,GERD, Chronic opiate therapy, Former smoker, presents to ED with daughter due to syncopal episode.     Patient states she began to feel unwell on Friday with fatigue and myalgias and went to an urgent care suspecting flu where she was given steroid injection.  States had a very transient episode of dyspnea that day, none since then. Even after discharge from  she had worsening fatigue, generalized weakness. Multiple falls and syncopal episodes along with poor appetite and oral intake. Admits to 2-3 episodes of syncope yesterday and this AM morning just prior to arrival, prompting ED visit; daughter states that she was woken by patient following to the floor. Patient denies any known head injury, denies headache or acute neck pain.  Denies unilateral arm or leg weakness, slurred speech, facial droop, unsteady gait, aphasia. No cough.  No odynophagia.  No rhinorrhea or congestion.  Denies any recent illness.  No abdominal pain.  No nausea vomiting.  Denies diarrhea. Admits to difficulty with weight-bearing due to lightheadedness, needing to support herself.  No history of CVA.  Daughter states that she was on the ground for approx 1 minute, eyes open, not really responding.  No apnea.  No CPR performed.  No generalized shaking movements.  No tongue biting.  No head turning.  No history of seizure disorder.  She denies any precursors chest  pain, shortness of breath, prior to onset of syncopal episodes.  Denies history of syncope. Denies personal history of ACS.  Denies hyperlipidemia.  Does admit to family history of premature cardiac disease.  No personal history of CHF.  Denies orthopnea or paroxysmal nocturnal dyspnea.  No new leg swelling. She presents to ED complaining of feeling lightheaded, fatigue, generally unwell.  She does admit to possible fever, chills off and on since onset of current complaints. She does admit to increased urinary frequency over the past 7-10d.  No dysuria, hematuria, or strong odor to urine.  Denies frequent UTIs.  Denies flank pain.  He admits to polydipsia beginning yesterday.  She denies ever having to be hospitalized for her diabetes. She was diagnosed with diabetes about 6-7 years ago, as type 2 and has been prescribed Metformin, but she struggles with taking it daily and misses multiple times. She does not recall any prior episodes of DKA or complications.    Receives Rituximab infusions for RA q4 months. Occasionally gets steroids for flares, last used it maybe 4 tp 5 months ago       * No surgery found *      Hospital Course:   Patient admitted with Septic Shock with BP 73/41. She was not started on antibiotics on admission since she was afebrile and WBC# was not yet elevated (did have 82% granulocytosis and WBC 11.6 which could be sign of early response) and normal LA of 0.9. This may be bc the patient is immunocompromised and may not be developing an febrile response. GNR in both her urine and blood, urosepsis (presumptive E.coli in both). A1c not well controlled at 10, adjust outpt insulin. Preg test neg. Covid and flu neg.      Take ciprofloxacin twice daily for 10 more days  Dont take zanaflex while on this antibiotic  Go to PCP as needed      Thank you for trusting [facility] and me with your care.  We are honored that you entrusted us with your healthcare needs. Your satisfaction is very  important to us and we hope you have been very pleased with your experience at [facility]. After your discharge you may receive a survey asking you to rate your hospital experience- Please help us by completing this survey. We hope that you have received the very best care possible during your hospitalization at [facility], as your satisfaction is our top priority.    Let me know if there is anything more I can do!!              Marcell Kiran MD        Medical Director        Section Head of         Board-Certified IM Attending      Goals of Care Treatment Preferences:  Code Status: Full Code      SDOH Screening:  The patient was unable to be screened for utility difficulties, food insecurity, transport difficulties, housing insecurity, and interpersonal safety, so no concerns could be identified this admission.     Consults:   Consults (From admission, onward)          Status Ordering Provider     Inpatient consult to Registered Dietitian/Nutritionist  Once        Provider:  (Not yet assigned)    Completed MARAH MUJICA            ID  * Septic shock  Sepsis 2/2 to GNR bacteremia 2/2 UTI  Shock: Yes, BP 73/41  SIRS:   WBC#: 82% granulocytosis WBC 12  TEMP: Afebrile, but on immunosuppressants   HR: wnl  RR: wnl  LA: 0.9  Initial ABx: CTX  Abx reduction as appropriate  Organ dysfunction: none  Patient will receive 30ml/kg actual body weight to calculate fluid bolus for treatment of sepsis and/or septic shock.   Perfusion exam was performed within 6 hours of septic shock presentation after bolus: Adequate tissue perfusion assessed by non-invasive monitoring   Will Start Pressors if MAP < 65  Source control achieved by: IV abx and fluids, source     Gram-negative bacteremia  See sepsis note        Final Active Diagnoses:    Diagnosis Date Noted POA    PRINCIPAL PROBLEM:  Septic shock [A41.9, R65.21] 11/19/2024 Yes    Gram-negative bacteremia [R78.81] 11/19/2024 Yes    Type 2 diabetes mellitus with  ketoacidosis without coma, without long-term current use of insulin [E11.10] 11/18/2024 Yes    Hypertension [I10] 12/11/2018 Yes    Rheumatoid arthritis [M06.9] 10/11/2011 Yes      Problems Resolved During this Admission:       Discharged Condition: good    Disposition: home    Follow Up:    Patient Instructions:      Ambulatory referral/consult to Internal Medicine   Standing Status: Future   Referral Priority: Routine Referral Type: Consultation   Referral Reason: Specialty Services Required   Requested Specialty: Internal Medicine   Number of Visits Requested: 1       Significant Diagnostic Studies:   Recent Results (from the past 100 hours)   POCT glucose    Collection Time: 11/18/24 12:54 AM   Result Value Ref Range    POCT Glucose 472 (HH) 70 - 110 mg/dL   POCT glucose    Collection Time: 11/18/24  1:02 AM   Result Value Ref Range    POCT Glucose >500 (HH) 70 - 110 mg/dL   EKG 12-lead    Collection Time: 11/18/24  1:02 AM   Result Value Ref Range    QRS Duration 88 ms    [facility] QTC Calculation 534 ms   CBC auto differential    Collection Time: 11/18/24  1:05 AM   Result Value Ref Range    WBC 11.62 3.90 - 12.70 K/uL    RBC 4.98 4.00 - 5.40 M/uL    Hemoglobin 14.7 12.0 - 16.0 g/dL    Hematocrit 41.9 37.0 - 48.5 %    MCV 84 82 - 98 fL    MCH 29.5 27.0 - 31.0 pg    MCHC 35.1 32.0 - 36.0 g/dL    RDW 11.7 11.5 - 14.5 %    Platelets 337 150 - 450 K/uL    MPV 10.3 9.2 - 12.9 fL    Immature Granulocytes 0.5 0.0 - 0.5 %    Gran # (ANC) 9.6 (H) 1.8 - 7.7 K/uL    Immature Grans (Abs) 0.06 (H) 0.00 - 0.04 K/uL    Lymph # 1.1 1.0 - 4.8 K/uL    Mono # 0.8 0.3 - 1.0 K/uL    Eos # 0.0 0.0 - 0.5 K/uL    Baso # 0.02 0.00 - 0.20 K/uL    nRBC 0 0 /100 WBC    Gran % 82.4 (H) 38.0 - 73.0 %    Lymph % 9.8 (L) 18.0 - 48.0 %    Mono % 7.1 4.0 - 15.0 %    Eosinophil % 0.0 0.0 - 8.0 %    Basophil % 0.2 0.0 - 1.9 %    Differential Method Automated    Comprehensive metabolic panel    Collection Time: 11/18/24  1:05 AM   Result Value Ref  Range    Sodium 127 (L) 136 - 145 mmol/L    Potassium 3.9 3.5 - 5.1 mmol/L    Chloride 90 (L) 95 - 110 mmol/L    CO2 16 (L) 23 - 29 mmol/L    Glucose 453 (HH) 70 - 110 mg/dL    BUN 16 6 - 20 mg/dL    Creatinine 1.3 0.5 - 1.4 mg/dL    Calcium 9.4 8.7 - 10.5 mg/dL    Total Protein 7.3 6.0 - 8.4 g/dL    Albumin 3.2 (L) 3.5 - 5.2 g/dL    Total Bilirubin 0.8 0.1 - 1.0 mg/dL    Alkaline Phosphatase 74 40 - 150 U/L    AST 10 10 - 40 U/L    ALT 15 10 - 44 U/L    eGFR 49 (A) >60 mL/min/1.73 m^2    Anion Gap 21 (H) 8 - 16 mmol/L   Beta - Hydroxybutyrate, Serum    Collection Time: 11/18/24  1:05 AM   Result Value Ref Range    Beta-Hydroxybutyrate 4.5 (H) 0.0 - 0.5 mmol/L   Magnesium    Collection Time: 11/18/24  1:05 AM   Result Value Ref Range    Magnesium 1.4 (L) 1.6 - 2.6 mg/dL   TSH    Collection Time: 11/18/24  1:05 AM   Result Value Ref Range    TSH 6.654 (H) 0.400 - 4.000 uIU/mL   BNP    Collection Time: 11/18/24  1:05 AM   Result Value Ref Range     (H) 0 - 99 pg/mL   Troponin I    Collection Time: 11/18/24  1:05 AM   Result Value Ref Range    Troponin I 0.015 0.000 - 0.026 ng/mL   T4, Free    Collection Time: 11/18/24  1:05 AM   Result Value Ref Range    Free T4 1.20 0.71 - 1.51 ng/dL   Blood Culture #2 **CANNOT BE ORDERED STAT**    Collection Time: 11/18/24  1:06 AM    Specimen: Peripheral, Antecubital, Left; Blood   Result Value Ref Range    Blood Culture, Routine No Growth to date     Blood Culture, Routine No Growth to date     Blood Culture, Routine No Growth to date     Blood Culture, Routine No Growth to date    Blood Culture #1 **CANNOT BE ORDERED STAT**    Collection Time: 11/18/24  1:06 AM    Specimen: Peripheral, Antecubital, Left; Blood   Result Value Ref Range    Blood Culture, Routine Gram stain aer bottle: Gram negative rods     Blood Culture, Routine       Results called to and read back by: OPHELIA ALVAREZ 11/19/2024  01:01    Blood Culture, Routine ESCHERICHIA COLI (A)        Susceptibility     Escherichia coli - CULTURE, BLOOD     Amp/Sulbactam <=8/4 Sensitive mcg/mL     Ampicillin <=8 Sensitive mcg/mL     Ceftriaxone <=1 Sensitive mcg/mL     Cefazolin <=2 Sensitive mcg/mL     Ciprofloxacin <=0.25 Sensitive mcg/mL     Cefepime <=2 Sensitive mcg/mL     Ertapenem <=0.5 Sensitive mcg/mL     Gentamicin <=2 Sensitive mcg/mL     Levofloxacin <=0.5 Sensitive mcg/mL     Meropenem <=1 Sensitive mcg/mL     Piperacillin/Tazo <=8 Sensitive mcg/mL     Trimeth/Sulfa <=2/38 Sensitive mcg/mL     Tobramycin <=2 Sensitive mcg/mL   ISTAT PROCEDURE    Collection Time: 11/18/24  1:17 AM   Result Value Ref Range    POC PH 7.289 (LL) 7.35 - 7.45    POC PCO2 43.5 35 - 45 mmHg    POC PO2 32 (LL) 40 - 60 mmHg    POC HCO3 20.9 (L) 24 - 28 mmol/L    POC BE -6 (L) -2 to 2 mmol/L    POC SATURATED O2 55 95 - 100 %    POC TCO2 22 (L) 24 - 29 mmol/L    Sample VENOUS     Site Other     Allens Test N/A    POCT glucose    Collection Time: 11/18/24  2:13 AM   Result Value Ref Range    POCT Glucose 422 (H) 70 - 110 mg/dL   POCT Influenza A/B Molecular    Collection Time: 11/18/24  2:22 AM   Result Value Ref Range    POC Molecular Influenza A Ag Negative Negative    POC Molecular Influenza B Ag Negative Negative     Acceptable Yes    POCT COVID-19 Rapid Screening    Collection Time: 11/18/24  2:31 AM   Result Value Ref Range    POC Rapid COVID Negative Negative     Acceptable Yes    Urinalysis, Reflex to Urine Culture Urine, Clean Catch    Collection Time: 11/18/24  2:44 AM    Specimen: Urine   Result Value Ref Range    Specimen UA Urine, Clean Catch     Color, UA Yellow Yellow, Straw, Kayli    Appearance, UA Hazy (A) Clear    pH, UA 6.0 5.0 - 8.0    Specific Gravity, UA 1.025 1.005 - 1.030    Protein, UA 1+ (A) Negative    Glucose, UA 4+ (A) Negative    Ketones, UA 3+ (A) Negative    Bilirubin (UA) Negative Negative    Occult Blood UA Trace (A) Negative    Nitrite, UA Negative Negative    Urobilinogen, UA  Negative <2.0 EU/dL    Leukocytes, UA 3+ (A) Negative   Urinalysis Microscopic    Collection Time: 11/18/24  2:44 AM   Result Value Ref Range    RBC, UA 23 (H) 0 - 4 /hpf    WBC, UA >100 (H) 0 - 5 /hpf    WBC Clumps, UA Rare None-Rare    Bacteria Rare None-Occ /hpf    Yeast, UA None None    Squam Epithel, UA 4 /hpf    Hyaline Casts, UA 0 0-1/lpf /lpf    Microscopic Comment SEE COMMENT    Urine culture    Collection Time: 11/18/24  2:44 AM    Specimen: Urine   Result Value Ref Range    Urine Culture, Routine ESCHERICHIA COLI  >100,000 cfu/ml   (A)        Susceptibility    Escherichia coli - CULTURE, URINE     Amp/Sulbactam <=8/4 Sensitive mcg/mL     Ampicillin <=8 Sensitive mcg/mL     Ceftriaxone <=1 Sensitive mcg/mL     Cefazolin <=2 Sensitive mcg/mL     Ciprofloxacin <=0.25 Sensitive mcg/mL     Cefepime <=2 Sensitive mcg/mL     Ertapenem <=0.5 Sensitive mcg/mL     Nitrofurantoin <=32 Sensitive mcg/mL     Gentamicin <=2 Sensitive mcg/mL     Levofloxacin <=0.5 Sensitive mcg/mL     Meropenem <=1 Sensitive mcg/mL     Piperacillin/Tazo <=8 Sensitive mcg/mL     Trimeth/Sulfa <=2/38 Sensitive mcg/mL     Tobramycin <=2 Sensitive mcg/mL   Hemoglobin A1C    Collection Time: 11/18/24  3:10 AM   Result Value Ref Range    Hemoglobin A1C 9.8 (H) 4.0 - 5.6 %    Estimated Avg Glucose 235 (H) 68 - 131 mg/dL   POCT glucose    Collection Time: 11/18/24  3:20 AM   Result Value Ref Range    POCT Glucose 374 (H) 70 - 110 mg/dL   POCT glucose    Collection Time: 11/18/24  4:52 AM   Result Value Ref Range    POCT Glucose 256 (H) 70 - 110 mg/dL   Phosphorus    Collection Time: 11/18/24  6:25 AM   Result Value Ref Range    Phosphorus 3.7 2.7 - 4.5 mg/dL   Lipid panel    Collection Time: 11/18/24  6:25 AM   Result Value Ref Range    Cholesterol 142 120 - 199 mg/dL    Triglycerides 125 30 - 150 mg/dL    HDL 33 (L) 40 - 75 mg/dL    LDL Cholesterol 84.0 63.0 - 159.0 mg/dL    HDL/Cholesterol Ratio 23.2 20.0 - 50.0 %    Total Cholesterol/HDL  Ratio 4.3 2.0 - 5.0    Non-HDL Cholesterol 109 mg/dL   Basic Metabolic Panel (BMP)    Collection Time: 11/18/24  6:25 AM   Result Value Ref Range    Sodium 130 (L) 136 - 145 mmol/L    Potassium 3.9 3.5 - 5.1 mmol/L    Chloride 97 95 - 110 mmol/L    CO2 16 (L) 23 - 29 mmol/L    Glucose 217 (H) 70 - 110 mg/dL    BUN 13 6 - 20 mg/dL    Creatinine 0.8 0.5 - 1.4 mg/dL    Calcium 8.9 8.7 - 10.5 mg/dL    Anion Gap 17 (H) 8 - 16 mmol/L    eGFR >60 >60 mL/min/1.73 m^2   Magnesium    Collection Time: 11/18/24  6:25 AM   Result Value Ref Range    Magnesium 1.3 (L) 1.6 - 2.6 mg/dL   POCT glucose    Collection Time: 11/18/24  6:25 AM   Result Value Ref Range    POCT Glucose 238 (H) 70 - 110 mg/dL   POCT glucose    Collection Time: 11/18/24  7:28 AM   Result Value Ref Range    POCT Glucose 211 (H) 70 - 110 mg/dL   POCT glucose    Collection Time: 11/18/24  8:24 AM   Result Value Ref Range    POCT Glucose 170 (H) 70 - 110 mg/dL   POCT glucose    Collection Time: 11/18/24  9:34 AM   Result Value Ref Range    POCT Glucose 152 (H) 70 - 110 mg/dL   Lactic acid, plasma    Collection Time: 11/18/24 10:19 AM   Result Value Ref Range    Lactate (Lactic Acid) 0.9 0.5 - 2.2 mmol/L   HCG, Quantitative    Collection Time: 11/18/24 10:19 AM   Result Value Ref Range    HCG Quant 2.1 See Text mIU/mL   Basic Metabolic Panel (BMP)    Collection Time: 11/18/24 10:19 AM   Result Value Ref Range    Sodium 135 (L) 136 - 145 mmol/L    Potassium 3.9 3.5 - 5.1 mmol/L    Chloride 98 95 - 110 mmol/L    CO2 23 23 - 29 mmol/L    Glucose 111 (H) 70 - 110 mg/dL    BUN 9 6 - 20 mg/dL    Creatinine 0.8 0.5 - 1.4 mg/dL    Calcium 9.1 8.7 - 10.5 mg/dL    Anion Gap 14 8 - 16 mmol/L    eGFR >60 >60 mL/min/1.73 m^2   POCT glucose    Collection Time: 11/18/24 10:26 AM   Result Value Ref Range    POCT Glucose 125 (H) 70 - 110 mg/dL   POCT glucose    Collection Time: 11/18/24 11:34 AM   Result Value Ref Range    POCT Glucose 109 70 - 110 mg/dL   POCT glucose     Collection Time: 11/18/24  3:56 PM   Result Value Ref Range    POCT Glucose 154 (H) 70 - 110 mg/dL   POCT glucose    Collection Time: 11/18/24  7:40 PM   Result Value Ref Range    POCT Glucose 210 (H) 70 - 110 mg/dL   CBC auto differential    Collection Time: 11/19/24  4:35 AM   Result Value Ref Range    WBC 8.73 3.90 - 12.70 K/uL    RBC 5.09 4.00 - 5.40 M/uL    Hemoglobin 14.8 12.0 - 16.0 g/dL    Hematocrit 42.6 37.0 - 48.5 %    MCV 84 82 - 98 fL    MCH 29.1 27.0 - 31.0 pg    MCHC 34.7 32.0 - 36.0 g/dL    RDW 11.9 11.5 - 14.5 %    Platelets 299 150 - 450 K/uL    MPV 10.2 9.2 - 12.9 fL    Immature Granulocytes 0.3 0.0 - 0.5 %    Gran # (ANC) 5.8 1.8 - 7.7 K/uL    Immature Grans (Abs) 0.03 0.00 - 0.04 K/uL    Lymph # 1.9 1.0 - 4.8 K/uL    Mono # 1.0 0.3 - 1.0 K/uL    Eos # 0.0 0.0 - 0.5 K/uL    Baso # 0.01 0.00 - 0.20 K/uL    nRBC 0 0 /100 WBC    Gran % 66.0 38.0 - 73.0 %    Lymph % 22.1 18.0 - 48.0 %    Mono % 11.2 4.0 - 15.0 %    Eosinophil % 0.3 0.0 - 8.0 %    Basophil % 0.1 0.0 - 1.9 %    Differential Method Automated    Basic Metabolic Panel (BMP)    Collection Time: 11/19/24  4:35 AM   Result Value Ref Range    Sodium 137 136 - 145 mmol/L    Potassium 3.2 (L) 3.5 - 5.1 mmol/L    Chloride 100 95 - 110 mmol/L    CO2 25 23 - 29 mmol/L    Glucose 228 (H) 70 - 110 mg/dL    BUN 7 6 - 20 mg/dL    Creatinine 0.7 0.5 - 1.4 mg/dL    Calcium 9.5 8.7 - 10.5 mg/dL    Anion Gap 12 8 - 16 mmol/L    eGFR >60 >60 mL/min/1.73 m^2   POCT glucose    Collection Time: 11/19/24  7:37 AM   Result Value Ref Range    POCT Glucose 216 (H) 70 - 110 mg/dL   Lactic Acid, Plasma    Collection Time: 11/19/24 10:42 AM   Result Value Ref Range    Lactate (Lactic Acid) 0.8 0.5 - 2.2 mmol/L   Procalcitonin    Collection Time: 11/19/24 10:42 AM   Result Value Ref Range    Procalcitonin 0.38 (H) <0.25 ng/mL   C-Reactive Protein    Collection Time: 11/19/24 10:42 AM   Result Value Ref Range    CRP 55.7 (H) 0.0 - 8.2 mg/L   Blood culture     Collection Time: 11/19/24 10:42 AM    Specimen: Peripheral, Hand, Right; Blood   Result Value Ref Range    Blood Culture, Routine No Growth to date     Blood Culture, Routine No Growth to date    Blood culture    Collection Time: 11/19/24 10:49 AM    Specimen: Blood   Result Value Ref Range    Blood Culture, Routine No Growth to date     Blood Culture, Routine No Growth to date    POCT glucose    Collection Time: 11/19/24 11:41 AM   Result Value Ref Range    POCT Glucose 230 (H) 70 - 110 mg/dL   POCT glucose    Collection Time: 11/19/24  4:27 PM   Result Value Ref Range    POCT Glucose 215 (H) 70 - 110 mg/dL   POCT glucose    Collection Time: 11/19/24  7:30 PM   Result Value Ref Range    POCT Glucose 181 (H) 70 - 110 mg/dL   CBC auto differential    Collection Time: 11/20/24  4:06 AM   Result Value Ref Range    WBC 10.31 3.90 - 12.70 K/uL    RBC 5.13 4.00 - 5.40 M/uL    Hemoglobin 14.5 12.0 - 16.0 g/dL    Hematocrit 43.2 37.0 - 48.5 %    MCV 84 82 - 98 fL    MCH 28.3 27.0 - 31.0 pg    MCHC 33.6 32.0 - 36.0 g/dL    RDW 12.1 11.5 - 14.5 %    Platelets 354 150 - 450 K/uL    MPV 10.1 9.2 - 12.9 fL    Immature Granulocytes 0.5 0.0 - 0.5 %    Gran # (ANC) 6.8 1.8 - 7.7 K/uL    Immature Grans (Abs) 0.05 (H) 0.00 - 0.04 K/uL    Lymph # 2.5 1.0 - 4.8 K/uL    Mono # 0.9 0.3 - 1.0 K/uL    Eos # 0.1 0.0 - 0.5 K/uL    Baso # 0.03 0.00 - 0.20 K/uL    nRBC 0 0 /100 WBC    Gran % 65.6 38.0 - 73.0 %    Lymph % 24.2 18.0 - 48.0 %    Mono % 8.6 4.0 - 15.0 %    Eosinophil % 0.8 0.0 - 8.0 %    Basophil % 0.3 0.0 - 1.9 %    Differential Method Automated    Basic Metabolic Panel (BMP)    Collection Time: 11/20/24  4:06 AM   Result Value Ref Range    Sodium 139 136 - 145 mmol/L    Potassium 3.5 3.5 - 5.1 mmol/L    Chloride 102 95 - 110 mmol/L    CO2 25 23 - 29 mmol/L    Glucose 214 (H) 70 - 110 mg/dL    BUN 7 6 - 20 mg/dL    Creatinine 0.7 0.5 - 1.4 mg/dL    Calcium 9.4 8.7 - 10.5 mg/dL    Anion Gap 12 8 - 16 mmol/L    eGFR >60 >60  mL/min/1.73 m^2   POCT glucose    Collection Time: 11/20/24  7:01 AM   Result Value Ref Range    POCT Glucose 277 (H) 70 - 110 mg/dL   POCT glucose    Collection Time: 11/20/24 11:35 AM   Result Value Ref Range    POCT Glucose 248 (H) 70 - 110 mg/dL   POCT glucose    Collection Time: 11/20/24  4:21 PM   Result Value Ref Range    POCT Glucose 157 (H) 70 - 110 mg/dL   POCT glucose    Collection Time: 11/20/24  7:20 PM   Result Value Ref Range    POCT Glucose 313 (H) 70 - 110 mg/dL   CBC auto differential    Collection Time: 11/21/24  5:01 AM   Result Value Ref Range    WBC 10.01 3.90 - 12.70 K/uL    RBC 4.76 4.00 - 5.40 M/uL    Hemoglobin 13.8 12.0 - 16.0 g/dL    Hematocrit 40.5 37.0 - 48.5 %    MCV 85 82 - 98 fL    MCH 29.0 27.0 - 31.0 pg    MCHC 34.1 32.0 - 36.0 g/dL    RDW 12.1 11.5 - 14.5 %    Platelets 377 150 - 450 K/uL    MPV 9.5 9.2 - 12.9 fL    Immature Granulocytes 0.4 0.0 - 0.5 %    Gran # (ANC) 6.0 1.8 - 7.7 K/uL    Immature Grans (Abs) 0.04 0.00 - 0.04 K/uL    Lymph # 3.0 1.0 - 4.8 K/uL    Mono # 0.8 0.3 - 1.0 K/uL    Eos # 0.2 0.0 - 0.5 K/uL    Baso # 0.04 0.00 - 0.20 K/uL    nRBC 0 0 /100 WBC    Gran % 60.2 38.0 - 73.0 %    Lymph % 29.6 18.0 - 48.0 %    Mono % 7.7 4.0 - 15.0 %    Eosinophil % 1.7 0.0 - 8.0 %    Basophil % 0.4 0.0 - 1.9 %    Differential Method Automated    Basic Metabolic Panel (BMP)    Collection Time: 11/21/24  5:01 AM   Result Value Ref Range    Sodium 140 136 - 145 mmol/L    Potassium 3.7 3.5 - 5.1 mmol/L    Chloride 102 95 - 110 mmol/L    CO2 28 23 - 29 mmol/L    Glucose 269 (H) 70 - 110 mg/dL    BUN 14 6 - 20 mg/dL    Creatinine 0.7 0.5 - 1.4 mg/dL    Calcium 8.9 8.7 - 10.5 mg/dL    Anion Gap 10 8 - 16 mmol/L    eGFR >60 >60 mL/min/1.73 m^2   POCT glucose    Collection Time: 11/21/24  7:47 AM   Result Value Ref Range    POCT Glucose 250 (H) 70 - 110 mg/dL       Microbiology Results (last 7 days)       Procedure Component Value Units Date/Time    Blood Culture #2 **CANNOT BE  ORDERED STAT** [[id number]] Collected: 11/18/24 0106    Order Status: Completed Specimen: Blood from Peripheral, Antecubital, Left Updated: 11/21/24 0303     Blood Culture, Routine No Growth to date      No Growth to date      No Growth to date      No Growth to date    Blood culture [[id number]] Collected: 11/19/24 1049    Order Status: Completed Specimen: Blood Updated: 11/20/24 1303     Blood Culture, Routine No Growth to date      No Growth to date    Blood culture [[id number]] Collected: 11/19/24 1042    Order Status: Completed Specimen: Blood from Peripheral, Hand, Right Updated: 11/20/24 1303     Blood Culture, Routine No Growth to date      No Growth to date    Urine culture [[id number]]  (Abnormal)  (Susceptibility) Collected: 11/18/24 0244    Order Status: Completed Specimen: Urine Updated: 11/20/24 0846     Urine Culture, Routine ESCHERICHIA COLI  >100,000 cfu/ml      Narrative:      Specimen Source->Urine    Blood Culture #1 **CANNOT BE ORDERED STAT** [[id number]]  (Abnormal)  (Susceptibility) Collected: 11/18/24 0106    Order Status: Completed Specimen: Blood from Peripheral, Antecubital, Left Updated: 11/20/24 0844     Blood Culture, Routine Gram stain aer bottle: Gram negative rods      Results called to and read back by: OPHELIA ALVAREZ 11/19/2024  01:01      ESCHERICHIA COLI    Rapid Organism ID by PCR (from Blood culture) [[id number]] Collected: 11/19/24 0106    Order Status: No result Updated: 11/19/24 1159            Imaging Results              X-Ray Chest AP Portable (Final result)  Result time 11/18/24 02:31:25      Final result by Rhianna Huerta MD (11/18/24 02:31:25)                   Impression:      No acute intrathoracic abnormality identified on this single radiographic view of the chest.      Electronically signed by: Rhianna Huerta MD  Date:    11/18/2024  Time:    02:31               Narrative:    EXAMINATION:  XR CHEST AP PORTABLE    CLINICAL  HISTORY:  hypotension;    TECHNIQUE:  Single frontal view of the chest was performed.    COMPARISON:  None    FINDINGS:  Cardiac monitoring leads overlie the chest.  The cardiomediastinal silhouette is within normal limits. Mediastinal structures are midline.  The lungs appear symmetrically expanded without definite evidence of confluent airspace consolidation, significant volume of pleural fluid or pneumothorax.  Visualized osseous structures are intact.  Surgical clips project over the right upper quadrant.                                       CT Head Without Contrast (Final result)  Result time 11/18/24 02:55:10      Final result by Rhianna Huerta MD (11/18/24 02:55:10)                   Impression:      No CT evidence of acute intracranial abnormality. Clinical correlation and further evaluation as warranted.      Electronically signed by: Rhianna Huerta MD  Date:    11/18/2024  Time:    02:55               Narrative:    EXAMINATION:  CT HEAD WITHOUT CONTRAST    CLINICAL HISTORY:  Syncope, recurrent;    TECHNIQUE:  Low dose axial images were obtained through the head.  Coronal and sagittal reformations were also performed. Contrast was not administered.    COMPARISON:  None.    FINDINGS:  There is no acute intracranial hemorrhage, hydrocephalus, midline shift or mass effect. Gray-white matter differentiation appears maintained. The basal cisterns are patent. The visualized paranasal sinuses and mastoid air cells are clear of acute process.  The visualized bones of the calvarium demonstrate no acute osseous abnormality.                                       CT Cervical Spine Without Contrast (Final result)  Result time 11/18/24 03:02:20      Final result by Rhianna Huerta MD (11/18/24 03:02:20)                   Impression:      1. No CT evidence of acute cervical spine fracture or traumatic subluxation.  Clinical correlation and further assessment as warranted.  2. Degenerative change of the cervical spine  most pronounced at the C5-C6, C6-C7 and C2-C3 levels.  Further assessment of degenerative change can be performed with nonemergent MRI if there are no patient contraindications and if clinically indicated.      Electronically signed by: Rhianna Huerta MD  Date:    11/18/2024  Time:    03:02               Narrative:    EXAMINATION:  CT CERVICAL SPINE WITHOUT CONTRAST    CLINICAL HISTORY:  Neck trauma, uncomplicated (NEXUS/CCR neg) (Age 16-64y);    TECHNIQUE:  Low dose axial images, sagittal and coronal reformations were performed though the cervical spine.  Contrast was not administered.    COMPARISON:  None    FINDINGS:  There is straightening and slight reversal of normal cervical lordosis.  There is minimal anterolisthesis of C2 on C3, presumably degenerative in etiology noting left-sided facet arthropathy at the C2-C3 level.  The facet joints articulate appropriately.  No significant prevertebral soft tissue swelling.  Vertebral body heights appear appropriately maintained.  There is intervertebral disc height loss at the C5-C6 and C6-C7 levels with anterior osteophytosis.  There are posterior disc osteophyte complexes at these levels with subsequent canal stenosis.  Uncovertebral joint DJD at these levels result in mild neural foraminal narrowing.  The visualized skull base is intact.  Visualized soft tissue structures are within normal limits.  The visualized lung apices are free of pleural fluid or focal consolidation.                                          Pending Diagnostic Studies:       None           Medications:  Reconciled Home Medications:      Medication List        START taking these medications      ciprofloxacin HCl 500 MG tablet  Commonly known as: CIPRO  Take 1 tablet (500 mg total) by mouth every 12 (twelve) hours. for 10 days            CONTINUE taking these medications      ergocalciferol 50,000 unit Cap  Commonly known as: ERGOCALCIFEROL  Take 50,000 Units by mouth.     EScitalopram oxalate  10 MG tablet  Commonly known as: LEXAPRO  Take 10 mg by mouth.     folic acid 1 MG tablet  Commonly known as: FOLVITE  Take 1 mg by mouth once daily.     HYDROcodone-acetaminophen  mg per tablet  Commonly known as: NORCO  Take 1 tablet by mouth 3 times daily as needed.     lisinopriL 20 MG tablet  Commonly known as: PRINIVIL,ZESTRIL  Take 20 mg by mouth.     metFORMIN 500 MG tablet  Commonly known as: GLUCOPHAGE  Take 500 mg by mouth 2 (two) times daily with meals.     pregabalin 100 MG capsule  Commonly known as: LYRICA  Take 1 capsule by mouth 3 (three) times daily.     tiZANidine 4 MG tablet  Commonly known as: ZANAFLEX  Take 1 tablet in AM and 2 tablets in PM as needed.              Indwelling Lines/Drains at time of discharge:   Lines/Drains/Airways       None                   Time spent on the discharge of patient: 35 minutes         Marcell Kiran MD  Department of Hospital Medicine  [facility] [place]

## 2024-11-21 NOTE — PLAN OF CARE
Case Management Final Discharge Note      Discharge Disposition: Home    New DME ordered / company name: None    Relevant SDOH / Transition of Care Barriers:  None identified at this time    Primary Caretaker and contact information: pt is independent    Scheduled followup appointment: PCP 11/22; message sent to endocrinology asking them to schedule pt for an appointment - Dr. Kiran stated he would like pt to follow up with them    Referrals placed: Internal Medicine    Transportation: private vehicle      Patient educated on discharge services and updated on DC plan. Bedside RN notified, patient clear to discharge from Case Management Perspective.      11/21/24 1327   Final Note   Assessment Type Final Discharge Note   Anticipated Discharge Disposition Home   Hospital Resources/Appts/Education Provided Appointments scheduled and added to AVS   Post-Acute Status   Discharge Delays None known at this time

## 2024-11-21 NOTE — TELEPHONE ENCOUNTER
----- Message from  Khadijah sent at 11/21/2024  1:25 PM CST -----  Regarding: Hospital follow up/establish care appointment  Good afternoon,   Her attending physician would like for Ms. Quezada to follow up with endocrinology. She's discharging today. Please schedule her for an appointment when able. Thank you

## 2024-11-21 NOTE — NURSING
Pt discharged per MD order. IV removed. Catheter tip intact. Telemetry box removed. No distress noted. AVS given to patient, discharge instructions explained per discharge nurse. Pt verbalized understanding. VSS. Afebrile. No complaints of pain, N/V, diarrhea, or SOB. Pt ambulated off unit with belongings to Main Entrance

## 2024-11-21 NOTE — PLAN OF CARE
Problem: Adult Inpatient Plan of Care  Goal: Plan of Care Review  Outcome: Adequate for Care Transition  Goal: Patient-Specific Goal (Individualized)  Outcome: Adequate for Care Transition  Goal: Absence of Hospital-Acquired Illness or Injury  Outcome: Adequate for Care Transition  Goal: Optimal Comfort and Wellbeing  Outcome: Adequate for Care Transition  Goal: Readiness for Transition of Care  Outcome: Adequate for Care Transition     Problem: Infection  Goal: Absence of Infection Signs and Symptoms  Outcome: Adequate for Care Transition     Problem: Diabetes Comorbidity  Goal: Blood Glucose Level Within Targeted Range  Outcome: Adequate for Care Transition     Problem: Diabetic Ketoacidosis  Goal: Optimal Coping  Outcome: Adequate for Care Transition  Goal: Fluid and Electrolyte Balance with Absence of Ketosis  Outcome: Adequate for Care Transition     Problem: Sepsis/Septic Shock  Goal: Optimal Coping  Outcome: Adequate for Care Transition  Goal: Absence of Bleeding  Outcome: Adequate for Care Transition  Goal: Blood Glucose Level Within Targeted Range  Outcome: Adequate for Care Transition  Goal: Absence of Infection Signs and Symptoms  Outcome: Adequate for Care Transition  Goal: Optimal Nutrition Intake  Outcome: Adequate for Care Transition

## 2024-11-21 NOTE — PROGRESS NOTES
[facility] Medicine  Progress Note    Patient Name: Patricia Quezada  MRN: [id number]  Patient Class: IP- Inpatient   Admission Date: 11/18/2024  Length of Stay: 3 days  Attending Physician: Marcell Kiran MD  Primary Care Provider: No primary care provider on file.        Subjective:     Principal Problem:Septic shock        HPI:  55yo F with PMH of Type 2 DM- poorly controlled, HTN, HLD, RA, Degenerative disc disease, Chronic pain, Depression ,GERD, Chronic opiate therapy, Former smoker, presents to ED with daughter due to syncopal episode.     Patient states she began to feel unwell on Friday with fatigue and myalgias and went to an urgent care suspecting flu where she was given steroid injection.  States had a very transient episode of dyspnea that day, none since then. Even after discharge from  she had worsening fatigue, generalized weakness. Multiple falls and syncopal episodes along with poor appetite and oral intake. Admits to 2-3 episodes of syncope yesterday and this AM morning just prior to arrival, prompting ED visit; daughter states that she was woken by patient following to the floor. Patient denies any known head injury, denies headache or acute neck pain.  Denies unilateral arm or leg weakness, slurred speech, facial droop, unsteady gait, aphasia. No cough.  No odynophagia.  No rhinorrhea or congestion.  Denies any recent illness.  No abdominal pain.  No nausea vomiting.  Denies diarrhea. Admits to difficulty with weight-bearing due to lightheadedness, needing to support herself.  No history of CVA.  Daughter states that she was on the ground for approx 1 minute, eyes open, not really responding.  No apnea.  No CPR performed.  No generalized shaking movements.  No tongue biting.  No head turning.  No history of seizure disorder.  She denies any precursors chest pain, shortness of breath, prior to onset of syncopal episodes.  Denies history of syncope. Denies personal history  of ACS.  Denies hyperlipidemia.  Does admit to family history of premature cardiac disease.  No personal history of CHF.  Denies orthopnea or paroxysmal nocturnal dyspnea.  No new leg swelling. She presents to ED complaining of feeling lightheaded, fatigue, generally unwell.  She does admit to possible fever, chills off and on since onset of current complaints. She does admit to increased urinary frequency over the past 7-10d.  No dysuria, hematuria, or strong odor to urine.  Denies frequent UTIs.  Denies flank pain.  He admits to polydipsia beginning yesterday.  She denies ever having to be hospitalized for her diabetes. She was diagnosed with diabetes about 6-7 years ago, as type 2 and has been prescribed Metformin, but she struggles with taking it daily and misses multiple times. She does not recall any prior episodes of DKA or complications.    Receives Rituximab infusions for RA q4 months. Occasionally gets steroids for flares, last used it maybe 4 tp 5 months ago       Overview/Hospital Course:  Patient admitted with Septic Shock with BP 73/41. She was not started on antibiotics on admission since she was afebrile and WBC# was not yet elevated (did have 82% granulocytosis and WBC 11.6 which could be sign of early response) and normal LA of 0.9. This may be bc the patient is immunocompromised and may not be developing an febrile response. GNR in both her urine and blood, urosepsis (presumptive E.coli in both). A1c not well controlled at 10, adjust outpt insulin. Preg test neg. Covid and flu neg.     Interval History:  NAEON.  No new issues.   CC- gen fatigue  All questions answered and updates on care given.       ROS:  General: Negative for fevers   Cardiac: Negative for chest pain   Pulmonary: Negative for wheezing  GI: Negative for abdominal distention      Vitals:    11/21/24 0312 11/21/24 0357 11/21/24 0722 11/21/24 0815   BP:  139/82 130/83    BP Location:  Right arm Right arm    Patient Position:  Lying  Lying    Pulse: 81 79 73 87   Resp:  18 16    Temp:  97.2 °F (36.2 °C) 97.6 °F (36.4 °C)    TempSrc:  Oral Oral    SpO2:  95% 98%    Weight:       Height:              Body mass index is 25.73 kg/m².      PHYSICAL EXAM:  GENERAL APPEARANCE: alert and cooperative.     HEAD: NC/AT  CARDIAC: There is no cyanosis or pallor.   LUNGS: No apparent wheezing or stridor.  ABDOMEN: Non-distended. No guarding.  MSK: No joint erythema or tenderness.   EXTREMITIES: No significant new deformity or new joint abnormality.   NEUROLOGICAL: CN II-XII grossly intact.   SKIN: No lesions or eruptions.  PSYCHIATRIC: No tangential speech. No Hyperactive features.        Recent Results (from the past 24 hours)   POCT glucose    Collection Time: 11/20/24 11:35 AM   Result Value Ref Range    POCT Glucose 248 (H) 70 - 110 mg/dL   POCT glucose    Collection Time: 11/20/24  4:21 PM   Result Value Ref Range    POCT Glucose 157 (H) 70 - 110 mg/dL   POCT glucose    Collection Time: 11/20/24  7:20 PM   Result Value Ref Range    POCT Glucose 313 (H) 70 - 110 mg/dL   CBC auto differential    Collection Time: 11/21/24  5:01 AM   Result Value Ref Range    WBC 10.01 3.90 - 12.70 K/uL    RBC 4.76 4.00 - 5.40 M/uL    Hemoglobin 13.8 12.0 - 16.0 g/dL    Hematocrit 40.5 37.0 - 48.5 %    MCV 85 82 - 98 fL    MCH 29.0 27.0 - 31.0 pg    MCHC 34.1 32.0 - 36.0 g/dL    RDW 12.1 11.5 - 14.5 %    Platelets 377 150 - 450 K/uL    MPV 9.5 9.2 - 12.9 fL    Immature Granulocytes 0.4 0.0 - 0.5 %    Gran # (ANC) 6.0 1.8 - 7.7 K/uL    Immature Grans (Abs) 0.04 0.00 - 0.04 K/uL    Lymph # 3.0 1.0 - 4.8 K/uL    Mono # 0.8 0.3 - 1.0 K/uL    Eos # 0.2 0.0 - 0.5 K/uL    Baso # 0.04 0.00 - 0.20 K/uL    nRBC 0 0 /100 WBC    Gran % 60.2 38.0 - 73.0 %    Lymph % 29.6 18.0 - 48.0 %    Mono % 7.7 4.0 - 15.0 %    Eosinophil % 1.7 0.0 - 8.0 %    Basophil % 0.4 0.0 - 1.9 %    Differential Method Automated    Basic Metabolic Panel (BMP)    Collection Time: 11/21/24  5:01 AM   Result Value  Ref Range    Sodium 140 136 - 145 mmol/L    Potassium 3.7 3.5 - 5.1 mmol/L    Chloride 102 95 - 110 mmol/L    CO2 28 23 - 29 mmol/L    Glucose 269 (H) 70 - 110 mg/dL    BUN 14 6 - 20 mg/dL    Creatinine 0.7 0.5 - 1.4 mg/dL    Calcium 8.9 8.7 - 10.5 mg/dL    Anion Gap 10 8 - 16 mmol/L    eGFR >60 >60 mL/min/1.73 m^2   POCT glucose    Collection Time: 11/21/24  7:47 AM   Result Value Ref Range    POCT Glucose 250 (H) 70 - 110 mg/dL       Microbiology Results (last 7 days)       Procedure Component Value Units Date/Time    Blood Culture #2 **CANNOT BE ORDERED STAT** [[id number]] Collected: 11/18/24 0106    Order Status: Completed Specimen: Blood from Peripheral, Antecubital, Left Updated: 11/21/24 0303     Blood Culture, Routine No Growth to date      No Growth to date      No Growth to date      No Growth to date    Blood culture [[id number]] Collected: 11/19/24 1049    Order Status: Completed Specimen: Blood Updated: 11/20/24 1303     Blood Culture, Routine No Growth to date      No Growth to date    Blood culture [[id number]] Collected: 11/19/24 1042    Order Status: Completed Specimen: Blood from Peripheral, Hand, Right Updated: 11/20/24 1303     Blood Culture, Routine No Growth to date      No Growth to date    Urine culture [[id number]]  (Abnormal)  (Susceptibility) Collected: 11/18/24 0244    Order Status: Completed Specimen: Urine Updated: 11/20/24 0846     Urine Culture, Routine ESCHERICHIA COLI  >100,000 cfu/ml      Narrative:      Specimen Source->Urine    Blood Culture #1 **CANNOT BE ORDERED STAT** [[id number]]  (Abnormal)  (Susceptibility) Collected: 11/18/24 0106    Order Status: Completed Specimen: Blood from Peripheral, Antecubital, Left Updated: 11/20/24 0844     Blood Culture, Routine Gram stain aer bottle: Gram negative rods      Results called to and read back by: OPHELIA ALVAREZ 11/19/2024  01:01      ESCHERICHIA COLI    Rapid Organism ID by PCR (from Blood culture) [[id number]] Collected: 11/19/24  0106    Order Status: No result Updated: 11/19/24 1159             Imaging Results              X-Ray Chest AP Portable (Final result)  Result time 11/18/24 02:31:25      Final result by Rhianna Huerta MD (11/18/24 02:31:25)                   Impression:      No acute intrathoracic abnormality identified on this single radiographic view of the chest.      Electronically signed by: Rhianna Huerta MD  Date:    11/18/2024  Time:    02:31               Narrative:    EXAMINATION:  XR CHEST AP PORTABLE    CLINICAL HISTORY:  hypotension;    TECHNIQUE:  Single frontal view of the chest was performed.    COMPARISON:  None    FINDINGS:  Cardiac monitoring leads overlie the chest.  The cardiomediastinal silhouette is within normal limits. Mediastinal structures are midline.  The lungs appear symmetrically expanded without definite evidence of confluent airspace consolidation, significant volume of pleural fluid or pneumothorax.  Visualized osseous structures are intact.  Surgical clips project over the right upper quadrant.                                       CT Head Without Contrast (Final result)  Result time 11/18/24 02:55:10      Final result by Rhianna Huerta MD (11/18/24 02:55:10)                   Impression:      No CT evidence of acute intracranial abnormality. Clinical correlation and further evaluation as warranted.      Electronically signed by: Rhianna Huerta MD  Date:    11/18/2024  Time:    02:55               Narrative:    EXAMINATION:  CT HEAD WITHOUT CONTRAST    CLINICAL HISTORY:  Syncope, recurrent;    TECHNIQUE:  Low dose axial images were obtained through the head.  Coronal and sagittal reformations were also performed. Contrast was not administered.    COMPARISON:  None.    FINDINGS:  There is no acute intracranial hemorrhage, hydrocephalus, midline shift or mass effect. Gray-white matter differentiation appears maintained. The basal cisterns are patent. The visualized paranasal sinuses and mastoid air  cells are clear of acute process.  The visualized bones of the calvarium demonstrate no acute osseous abnormality.                                       CT Cervical Spine Without Contrast (Final result)  Result time 11/18/24 03:02:20      Final result by Rhianna Huerta MD (11/18/24 03:02:20)                   Impression:      1. No CT evidence of acute cervical spine fracture or traumatic subluxation.  Clinical correlation and further assessment as warranted.  2. Degenerative change of the cervical spine most pronounced at the C5-C6, C6-C7 and C2-C3 levels.  Further assessment of degenerative change can be performed with nonemergent MRI if there are no patient contraindications and if clinically indicated.      Electronically signed by: Rhianna Huerta MD  Date:    11/18/2024  Time:    03:02               Narrative:    EXAMINATION:  CT CERVICAL SPINE WITHOUT CONTRAST    CLINICAL HISTORY:  Neck trauma, uncomplicated (NEXUS/CCR neg) (Age 16-64y);    TECHNIQUE:  Low dose axial images, sagittal and coronal reformations were performed though the cervical spine.  Contrast was not administered.    COMPARISON:  None    FINDINGS:  There is straightening and slight reversal of normal cervical lordosis.  There is minimal anterolisthesis of C2 on C3, presumably degenerative in etiology noting left-sided facet arthropathy at the C2-C3 level.  The facet joints articulate appropriately.  No significant prevertebral soft tissue swelling.  Vertebral body heights appear appropriately maintained.  There is intervertebral disc height loss at the C5-C6 and C6-C7 levels with anterior osteophytosis.  There are posterior disc osteophyte complexes at these levels with subsequent canal stenosis.  Uncovertebral joint DJD at these levels result in mild neural foraminal narrowing.  The visualized skull base is intact.  Visualized soft tissue structures are within normal limits.  The visualized lung apices are free of pleural fluid or focal  consolidation.                                             Assessment/Plan:      * Septic shock  Sepsis 2/2 to GNR bacteremia 2/2 UTI  Shock: Yes, BP 73/41  SIRS:   WBC#: 82% granulocytosis WBC 12  TEMP: Afebrile, but on immunosuppressants   HR: wnl  RR: wnl  LA: 0.9  Initial ABx: CTX  Abx reduction as appropriate  Organ dysfunction: none  Patient will receive 30ml/kg actual body weight to calculate fluid bolus for treatment of sepsis and/or septic shock.   Perfusion exam was performed within 6 hours of septic shock presentation after bolus: Adequate tissue perfusion assessed by non-invasive monitoring   Will Start Pressors if MAP < 65  Source control achieved by: IV abx and fluids, source     Gram-negative bacteremia  See sepsis note      Type 2 diabetes mellitus with ketoacidosis without coma, without long-term current use of insulin  Patient with hx of Type 2 DM with non-compliance presenting with dehydration and syncope. Labs notable for positive ketones and anion gap metabolic acidosis with hyperglycemia in the ED and started on insulin drip  Triggered by ? Steroid shot or medication non-compliance    Plan:  Treat as per DKA protocol with insulin drip  BMP and VBG q4hr  Replete electrolytes PRN  ABG/VBG to assess severity of acidemia  Evaluate for trigger for DKA with - CXR, UA, Blood Cultures for infection,  Troponin, EKG for ischemia and Lipase for pancreatitis  When normalize anion gap and low stable insulin requirement for 4 hours, transition to basal bolus regimen based on 24hr insulin requirement   Start long acting insulin then stop drip 2-3 hours AFTER to bridge effect  Stop fluids and start carb-controlled diet        Rheumatoid arthritis  No signs of an acute flare  Monitor      Hypertension  Patients blood pressure range in the last 24 hours was: BP  Min: 70/43  Max: 108/59.The patient's inpatient anti-hypertensive regimen is listed below:  Current Antihypertensives  , , Oral  lisinopriL tablet 20  mg, Daily, Oral    Plan  - BP is low, will hold Lisinopril  - Continue IV fluids as above      VTE Risk Mitigation (From admission, onward)           Ordered     IP VTE LOW RISK PATIENT  Once         11/18/24 0155     Place ELAINA hose  Until discontinued         11/18/24 0155     Place sequential compression device  Until discontinued         11/18/24 0155                    Discharge Planning   REGGIE:      Code Status: Full Code   Is the patient medically ready for discharge?:     Reason for patient still in hospital (select all that apply): Patient trending condition and Treatment  Discharge Plan A: Home                  Marcell Kiran MD  Department of Hospital Medicine   [facility] Surg

## 2024-11-22 LAB — BACTERIA BLD CULT: NORMAL

## 2024-11-23 LAB
BACTERIA BLD CULT: NORMAL
BACTERIA BLD CULT: NORMAL

## 2024-11-26 ENCOUNTER — HOSPITAL ENCOUNTER (EMERGENCY)
Facility: HOSPITAL | Age: 54
Discharge: HOME | End: 2024-11-26
Attending: EMERGENCY MEDICINE
Payer: COMMERCIAL

## 2024-11-26 VITALS
HEIGHT: 64 IN | TEMPERATURE: 98 F | DIASTOLIC BLOOD PRESSURE: 61 MMHG | OXYGEN SATURATION: 100 % | HEART RATE: 66 BPM | RESPIRATION RATE: 18 BRPM | BODY MASS INDEX: 26.29 KG/M2 | SYSTOLIC BLOOD PRESSURE: 139 MMHG | WEIGHT: 154 LBS

## 2024-11-26 DIAGNOSIS — N17.9 AKI (ACUTE KIDNEY INJURY): Primary | ICD-10-CM

## 2024-11-26 DIAGNOSIS — I95.9 HYPOTENSION: ICD-10-CM

## 2024-11-26 LAB
ALBUMIN SERPL BCP-MCNC: 3.2 G/DL (ref 3.5–5.2)
ALP SERPL-CCNC: 55 U/L (ref 40–150)
ALT SERPL W/O P-5'-P-CCNC: 13 U/L (ref 10–44)
ANION GAP SERPL CALC-SCNC: 10 MMOL/L (ref 8–16)
AST SERPL-CCNC: 12 U/L (ref 10–40)
B-OH-BUTYR BLD STRIP-SCNC: 0.1 MMOL/L (ref 0–0.5)
BASOPHILS # BLD AUTO: 0.04 K/UL (ref 0–0.2)
BASOPHILS NFR BLD: 0.4 % (ref 0–1.9)
BILIRUB SERPL-MCNC: 0.4 MG/DL (ref 0.1–1)
BILIRUB UR QL STRIP: NEGATIVE
BNP SERPL-MCNC: 76 PG/ML (ref 0–99)
BUN SERPL-MCNC: 24 MG/DL (ref 6–20)
CALCIUM SERPL-MCNC: 9.2 MG/DL (ref 8.7–10.5)
CHLORIDE SERPL-SCNC: 104 MMOL/L (ref 95–110)
CLARITY UR: CLEAR
CO2 SERPL-SCNC: 23 MMOL/L (ref 23–29)
COLOR UR: COLORLESS
CREAT SERPL-MCNC: 1.5 MG/DL (ref 0.5–1.4)
DIFFERENTIAL METHOD BLD: ABNORMAL
EOSINOPHIL # BLD AUTO: 0.1 K/UL (ref 0–0.5)
EOSINOPHIL NFR BLD: 1.2 % (ref 0–8)
ERYTHROCYTE [DISTWIDTH] IN BLOOD BY AUTOMATED COUNT: 12.2 % (ref 11.5–14.5)
EST. GFR  (NO RACE VARIABLE): 42 ML/MIN/1.73 M^2
GLUCOSE SERPL-MCNC: 181 MG/DL (ref 70–110)
GLUCOSE UR QL STRIP: ABNORMAL
HCT VFR BLD AUTO: 35.5 % (ref 37–48.5)
HGB BLD-MCNC: 12 G/DL (ref 12–16)
HGB UR QL STRIP: ABNORMAL
HYALINE CASTS #/AREA URNS LPF: 1 /LPF
IMM GRANULOCYTES # BLD AUTO: 0.07 K/UL (ref 0–0.04)
IMM GRANULOCYTES NFR BLD AUTO: 0.7 % (ref 0–0.5)
INR PPP: 0.9 (ref 0.8–1.2)
KETONES UR QL STRIP: NEGATIVE
LACTATE SERPL-SCNC: 1.4 MMOL/L (ref 0.5–2.2)
LEUKOCYTE ESTERASE UR QL STRIP: ABNORMAL
LYMPHOCYTES # BLD AUTO: 2.4 K/UL (ref 1–4.8)
LYMPHOCYTES NFR BLD: 24.4 % (ref 18–48)
MAGNESIUM SERPL-MCNC: 1.6 MG/DL (ref 1.6–2.6)
MCH RBC QN AUTO: 29.4 PG (ref 27–31)
MCHC RBC AUTO-ENTMCNC: 33.8 G/DL (ref 32–36)
MCV RBC AUTO: 87 FL (ref 82–98)
MICROSCOPIC COMMENT: NORMAL
MONOCYTES # BLD AUTO: 0.9 K/UL (ref 0.3–1)
MONOCYTES NFR BLD: 9.7 % (ref 4–15)
NEUTROPHILS # BLD AUTO: 6.1 K/UL (ref 1.8–7.7)
NEUTROPHILS NFR BLD: 63.6 % (ref 38–73)
NITRITE UR QL STRIP: NEGATIVE
NRBC BLD-RTO: 0 /100 WBC
PH UR STRIP: 6 [PH] (ref 5–8)
PHOSPHATE SERPL-MCNC: 3.4 MG/DL (ref 2.7–4.5)
PLATELET # BLD AUTO: 399 K/UL (ref 150–450)
PMV BLD AUTO: 9.2 FL (ref 9.2–12.9)
POCT GLUCOSE: 204 MG/DL (ref 70–110)
POTASSIUM SERPL-SCNC: 4.1 MMOL/L (ref 3.5–5.1)
PROCALCITONIN SERPL IA-MCNC: 0.11 NG/ML
PROT SERPL-MCNC: 6.4 G/DL (ref 6–8.4)
PROT UR QL STRIP: NEGATIVE
PROTHROMBIN TIME: 10.2 SEC (ref 9–12.5)
RBC # BLD AUTO: 4.08 M/UL (ref 4–5.4)
RBC #/AREA URNS HPF: 2 /HPF (ref 0–4)
SODIUM SERPL-SCNC: 137 MMOL/L (ref 136–145)
SP GR UR STRIP: 1.01 (ref 1–1.03)
SQUAMOUS #/AREA URNS HPF: 2 /HPF
TROPONIN I SERPL DL<=0.01 NG/ML-MCNC: <0.006 NG/ML (ref 0–0.03)
TSH SERPL DL<=0.005 MIU/L-ACNC: 0.9 UIU/ML (ref 0.4–4)
URN SPEC COLLECT METH UR: ABNORMAL
UROBILINOGEN UR STRIP-ACNC: NEGATIVE EU/DL
WBC # BLD AUTO: 9.67 K/UL (ref 3.9–12.7)
WBC #/AREA URNS HPF: 4 /HPF (ref 0–5)
WBC CLUMPS URNS QL MICRO: NORMAL

## 2024-11-26 PROCEDURE — 93010 ELECTROCARDIOGRAM REPORT: CPT | Mod: ,,, | Performed by: INTERNAL MEDICINE

## 2024-11-26 PROCEDURE — 63600175 PHARM REV CODE 636 W HCPCS: Performed by: EMERGENCY MEDICINE

## 2024-11-26 PROCEDURE — 80053 COMPREHEN METABOLIC PANEL: CPT | Performed by: EMERGENCY MEDICINE

## 2024-11-26 PROCEDURE — 84145 PROCALCITONIN (PCT): CPT | Performed by: EMERGENCY MEDICINE

## 2024-11-26 PROCEDURE — 85610 PROTHROMBIN TIME: CPT | Performed by: EMERGENCY MEDICINE

## 2024-11-26 PROCEDURE — 81000 URINALYSIS NONAUTO W/SCOPE: CPT | Performed by: EMERGENCY MEDICINE

## 2024-11-26 PROCEDURE — 83605 ASSAY OF LACTIC ACID: CPT | Performed by: EMERGENCY MEDICINE

## 2024-11-26 PROCEDURE — 83880 ASSAY OF NATRIURETIC PEPTIDE: CPT | Performed by: EMERGENCY MEDICINE

## 2024-11-26 PROCEDURE — 84443 ASSAY THYROID STIM HORMONE: CPT | Performed by: EMERGENCY MEDICINE

## 2024-11-26 PROCEDURE — 25000003 PHARM REV CODE 250: Performed by: EMERGENCY MEDICINE

## 2024-11-26 PROCEDURE — 99284 EMERGENCY DEPT VISIT MOD MDM: CPT | Mod: 25

## 2024-11-26 PROCEDURE — 96374 THER/PROPH/DIAG INJ IV PUSH: CPT

## 2024-11-26 PROCEDURE — 85025 COMPLETE CBC W/AUTO DIFF WBC: CPT | Performed by: EMERGENCY MEDICINE

## 2024-11-26 PROCEDURE — 82962 GLUCOSE BLOOD TEST: CPT

## 2024-11-26 PROCEDURE — 83735 ASSAY OF MAGNESIUM: CPT | Performed by: EMERGENCY MEDICINE

## 2024-11-26 PROCEDURE — 84484 ASSAY OF TROPONIN QUANT: CPT | Performed by: EMERGENCY MEDICINE

## 2024-11-26 PROCEDURE — 82010 KETONE BODYS QUAN: CPT | Performed by: EMERGENCY MEDICINE

## 2024-11-26 PROCEDURE — 93005 ELECTROCARDIOGRAM TRACING: CPT

## 2024-11-26 PROCEDURE — 87040 BLOOD CULTURE FOR BACTERIA: CPT | Mod: 59 | Performed by: EMERGENCY MEDICINE

## 2024-11-26 PROCEDURE — 84100 ASSAY OF PHOSPHORUS: CPT | Performed by: EMERGENCY MEDICINE

## 2024-11-26 RX ORDER — CEFTRIAXONE 1 G/1
1 INJECTION, POWDER, FOR SOLUTION INTRAMUSCULAR; INTRAVENOUS
Status: COMPLETED | OUTPATIENT
Start: 2024-11-26 | End: 2024-11-26

## 2024-11-26 RX ADMIN — SODIUM CHLORIDE 1000 ML: 9 INJECTION, SOLUTION INTRAVENOUS at 06:11

## 2024-11-26 RX ADMIN — CEFTRIAXONE 1 G: 1 INJECTION, POWDER, FOR SOLUTION INTRAMUSCULAR; INTRAVENOUS at 06:11

## 2024-11-26 NOTE — ED PROVIDER NOTES
Encounter Date: 11/26/2024    SCRIBE #1 NOTE: I, Cheri Jain, am scribing for, and in the presence of,  Beny Cooley MD. I have scribed the following portions of the note - Other sections scribed: HPI, ROS, PE.       History     Chief Complaint   Patient presents with    Weakness     Pt reports recently discharged from hospital for a blood infection x6 days ago and has had persistent fatigue and weakness. Was seen in [place] and had a BP of 66/33. Patient refused EMS transport to  ED. Patient also reports dizziness earlier today.       54 y.o. female, with a PMHx of HTN, DM Type 2, rheumatoid arthritis, who presents to the ED with generalized weakness onset PTA. Patient reports she noticed her BP was low at 85/56 last night, so she did not endorse her HTN medications today. States her BP was still at 66/33 at [facility], noting she was given 2L fluids prior to being transferred to the ED. Also states she had 1 episode of N/V yesterday. States she has been complaint with the antibiotics prescribed to her at her last visit for septic UTI. States she has an endocrinologist appointment in January to re-evaluate the treatment of her DM. No other exacerbating or alleviating factors. Denies chest pain, fever, or other associated symptoms.     ED Encounter and Admission 11/18/2024  Per chart review, pt was seen at this facility for fatigue and multiple episodes of syncope. Pt's BP was 70/43 on arrival. Labs showed eGFR 49, magnesium 1.4, , TSH 6.654, POCT glucose 472, POC PH 7.289. UA showed protein 1+, glucose 4+, ketones 3+, luekocytes 3+. Pt given Rocephin in the ED and admitted for UTI and DKA. Pt's blood cultures were positive for E. coli . She was not started on antibiotics on admission since she was afebrile and WBC# was not yet elevated (did have 82% granulocytosis and WBC 11.6 which could be sign of early response) and normal LA of 0.9. This may be bc the patient is immunocompromised and may  not be developing an febrile response. GNR in both her urine and blood, urosepsis (presumptive E.coli in both). A1c not well controlled at 10, adjust outpt insulin. Preg test neg. Covid and flu neg. Pt put on ciprofloxacin HCl and discharged home.      The history is provided by the patient and medical records. No  was used.     Review of patient's allergies indicates:  No Known Allergies  Past Medical History:   Diagnosis Date    Back pain     Depression     DM (diabetes mellitus)     Gallstone     GERD (gastroesophageal reflux disease)     HSV infection     HTN (hypertension)     Rheumatoid arthritis, unspecified      Past Surgical History:   Procedure Laterality Date    ANKLE SURGERY       SECTION      x2    CHOLECYSTECTOMY      FOOT SURGERY      LOOP ELECTROSURGICAL EXCISION PROCEDURE (LEEP)       No family history on file.  Social History     Tobacco Use    Smoking status: Unknown   Substance Use Topics    Alcohol use: Not Currently     Review of Systems   Constitutional:  Negative for fever.   HENT:  Negative for sore throat.    Eyes:  Negative for visual disturbance.   Respiratory:  Negative for cough and shortness of breath.    Cardiovascular:  Negative for chest pain and leg swelling.   Gastrointestinal:  Negative for abdominal pain, diarrhea, nausea and vomiting.   Genitourinary:  Negative for dysuria and hematuria.   Musculoskeletal:  Negative for back pain and neck pain.   Skin:  Negative for rash.   Neurological:  Positive for weakness (generalized). Negative for syncope.       Physical Exam     Initial Vitals [24 1556]   BP Pulse Resp Temp SpO2   (!) 106/57 74 18 98.4 °F (36.9 °C) 99 %      MAP       --         Physical Exam    Nursing note and vitals reviewed.  Constitutional: She appears well-developed and well-nourished.   HENT:   Head: Normocephalic and atraumatic.   Eyes: EOM are normal. Pupils are equal, round, and reactive to light.   Neck: Neck supple. No  thyromegaly present. No JVD present.   Normal range of motion.  Cardiovascular:  Normal rate, regular rhythm, normal heart sounds and intact distal pulses.     Exam reveals no gallop and no friction rub.       No murmur heard.  Pulmonary/Chest: Breath sounds normal. No respiratory distress.   Abdominal: Abdomen is soft. Bowel sounds are normal.   Musculoskeletal:         General: No tenderness or edema. Normal range of motion.      Cervical back: Normal range of motion and neck supple.     Neurological: She is alert and oriented to person, place, and time. She has normal strength.   Skin: Skin is warm and dry.         ED Course   Critical Care    Date/Time: 11/26/2024 1:24 PM    Performed by: Beny Cooley MD  Authorized by: Beny Cooley MD  Direct patient critical care time: 20 minutes  Additional history critical care time: 5 minutes  Ordering / reviewing critical care time: 10 minutes  Documentation critical care time: 10 minutes  Consulting other physicians critical care time: 10 minutes  Total critical care time (exclusive of procedural time) : 55 minutes  Critical care time was exclusive of teaching time and separately billable procedures and treating other patients.  Critical care was necessary to treat or prevent imminent or life-threatening deterioration of the following conditions: circulatory failure and renal failure.  Critical care was time spent personally by me on the following activities: development of treatment plan with patient or surrogate, discussions with consultants, evaluation of patient's response to treatment, examination of patient, obtaining history from patient or surrogate, ordering and performing treatments and interventions, ordering and review of laboratory studies, ordering and review of radiographic studies, pulse oximetry, re-evaluation of patient's condition and review of old charts.        Labs Reviewed   CBC W/ AUTO DIFFERENTIAL - Abnormal       Result Value     WBC 9.67      RBC 4.08      Hemoglobin 12.0      Hematocrit 35.5 (*)     MCV 87      MCH 29.4      MCHC 33.8      RDW 12.2      Platelets 399      MPV 9.2      Immature Granulocytes 0.7 (*)     Gran # (ANC) 6.1      Immature Grans (Abs) 0.07 (*)     Lymph # 2.4      Mono # 0.9      Eos # 0.1      Baso # 0.04      nRBC 0      Gran % 63.6      Lymph % 24.4      Mono % 9.7      Eosinophil % 1.2      Basophil % 0.4      Differential Method Automated     COMPREHENSIVE METABOLIC PANEL - Abnormal    Sodium 137      Potassium 4.1      Chloride 104      CO2 23      Glucose 181 (*)     BUN 24 (*)     Creatinine 1.5 (*)     Calcium 9.2      Total Protein 6.4      Albumin 3.2 (*)     Total Bilirubin 0.4      Alkaline Phosphatase 55      AST 12      ALT 13      eGFR 42 (*)     Anion Gap 10     URINALYSIS, REFLEX TO URINE CULTURE - Abnormal    Specimen UA Urine, Clean Catch      Color, UA Colorless (*)     Appearance, UA Clear      pH, UA 6.0      Specific Gravity, UA 1.010      Protein, UA Negative      Glucose, UA Trace (*)     Ketones, UA Negative      Bilirubin (UA) Negative      Occult Blood UA Trace (*)     Nitrite, UA Negative      Urobilinogen, UA Negative      Leukocytes, UA 1+ (*)     Narrative:     Specimen Source->Urine   POCT GLUCOSE - Abnormal    POCT Glucose 204 (*)    CULTURE, BLOOD    Blood Culture, Routine No Growth to date      Blood Culture, Routine No Growth to date      Blood Culture, Routine No Growth to date      Narrative:     Aerobic and anaerobic   CULTURE, BLOOD    Blood Culture, Routine No Growth to date      Blood Culture, Routine No Growth to date      Blood Culture, Routine No Growth to date      Narrative:     Aerobic and anaerobic   LACTIC ACID, PLASMA    Lactate (Lactic Acid) 1.4     MAGNESIUM    Magnesium 1.6     PHOSPHORUS    Phosphorus 3.4     PROTIME-INR    Prothrombin Time 10.2      INR 0.9     B-TYPE NATRIURETIC PEPTIDE    BNP 76     PROCALCITONIN    Procalcitonin 0.11     TROPONIN I     Troponin I <0.006     URINALYSIS MICROSCOPIC    RBC, UA 2      WBC, UA 4      WBC Clumps, UA Rare      Squam Epithel, UA 2      Hyaline Casts, UA 1      Microscopic Comment SEE COMMENT      Narrative:     Specimen Source->Urine   BETA - HYDROXYBUTYRATE, SERUM   BETA - HYDROXYBUTYRATE, SERUM    Beta-Hydroxybutyrate 0.1     TSH   TSH    TSH 0.900       EKG Readings: (Independently Interpreted)   Initial Reading: No STEMI. Ectopy: No Ectopy.   Sinus rhythm rate of 68, normal QT interval     ECG Results              EKG 12-lead (Final result)        Collection Time Result Time QRS Duration OHS QTC Calculation    11/26/24 16:18:32 11/27/24 17:44:12 86 463                     Final result by Interface, Lab In [facility] (11/27/24 17:44:21)                   Narrative:    Test Reason : I95.9,    Vent. Rate :  68 BPM     Atrial Rate : 234 BPM     P-R Int : 124 ms          QRS Dur :  86 ms      QT Int : 436 ms       P-R-T Axes :  53  71  41 degrees    QTcB Int : 463 ms    Sinus rhythm    Otherwise normal ECG  When compared with ECG of 18-Nov-2024 01:02,  QT has shortened      Confirmed by Иван Butcher ([id number]) on 11/27/2024 5:44:06 PM    Referred By: AAAREFERRAL SELF           Confirmed By: Иван Butcher                                  Imaging Results    None          Medications   cefTRIAXone injection 1 g (1 g Intravenous Given 11/26/24 1833)   sodium chloride 0.9% bolus 1,000 mL 1,000 mL (1,000 mLs Intravenous New Bag 11/26/24 1833)     Medical Decision Making  Amount and/or Complexity of Data Reviewed  External Data Reviewed: labs and notes.     Details: See HPI.   Labs: ordered. Decision-making details documented in ED Course.  Radiology: ordered and independent interpretation performed. Decision-making details documented in ED Course.  ECG/medicine tests: ordered and independent interpretation performed. Decision-making details documented in ED Course.            Scribe Attestation:   Scribe #1: I performed the above  scribed service and the documentation accurately describes the services I performed. I attest to the accuracy of the note.              Patient sent in for MINERVA.  Had creatinine of 2.2 at outside facility.  Was given 2 L of fluid.  Rechecked here is 1.5.  Given additional L of fluid.  She was initially hypotensive at the outside facility.  That hypotension has resolved.  She was now normotensive.  She was not orthostatic.  States she feels much improved.  Tolerating p.o..  Feels comfortable going home.  No evidence of sepsis.                 Clinical Impression:  Final diagnoses:  [I95.9] Hypotension  [N17.9] MINERVA (acute kidney injury) (Primary)          ED Disposition Condition    Discharge Stable          ED Prescriptions    None       Follow-up Information       Follow up With Specialties Details Why Contact Info    [facility] -  Schedule an appointment as soon as possible for a visit  Call your doctor tomorrow.  If he would not have 1 call this clinic to establish close follow up. [address] [place] [address]  [phone]      Claudette Lewis MD Nephrology Schedule an appointment as soon as possible for a visit  This is the name and number of a nephrologist (kidney doctor) recommend make an appointment and following up for recheck of kidney function. [address]  [place] [id number]  [phone]      [facility] - Emergency Dept Emergency Medicine  As needed, If symptoms return or new symptoms develop [address]  [facility] [place] [address] [phone]          I, Beny Cooley, personally performed the services described in this documentation. All medical record entries made by the scribe were at my direction and in my presence. I have reviewed the chart and agree that the record reflects my personal performance and is accurate and complete.      DISCLAIMER: This note was prepared with MModal voice recognition  transcription software. Garbled syntax, mangled pronouns, and other bizarre constructions may be attributed to that software system.       Beny Cooley MD  11/29/24 [id number]

## 2024-11-26 NOTE — ED TRIAGE NOTES
Pt to ED. Recently discharged from hospital 6 days ago. Admitted previously for sepsis per patient. Pt reports feeling dizzy, fatigued and weak this morning after reporting to work. Pt reports symptoms resolved at present. Pt denies any CP or SOB. Denies any N/V/F or diarrhea. Pt denies any slurred speech or facial drooping. No unilateral weakness per patient. Pt AAO x4. VSS.

## 2024-11-27 LAB
OHS QRS DURATION: 86 MS
OHS QTC CALCULATION: 463 MS

## 2024-11-30 LAB
BACTERIA BLD CULT: NORMAL
BACTERIA BLD CULT: NORMAL